# Patient Record
Sex: FEMALE | Race: OTHER | NOT HISPANIC OR LATINO
[De-identification: names, ages, dates, MRNs, and addresses within clinical notes are randomized per-mention and may not be internally consistent; named-entity substitution may affect disease eponyms.]

---

## 2017-01-05 ENCOUNTER — CHART COPY (OUTPATIENT)
Age: 37
End: 2017-01-05

## 2017-01-18 ENCOUNTER — APPOINTMENT (OUTPATIENT)
Dept: ENDOCRINOLOGY | Facility: CLINIC | Age: 37
End: 2017-01-18

## 2017-01-18 VITALS
SYSTOLIC BLOOD PRESSURE: 112 MMHG | DIASTOLIC BLOOD PRESSURE: 79 MMHG | HEART RATE: 79 BPM | HEIGHT: 68 IN | WEIGHT: 149 LBS | BODY MASS INDEX: 22.58 KG/M2

## 2017-01-19 LAB
25(OH)D3 SERPL-MCNC: 32.9 NG/ML
ALBUMIN SERPL ELPH-MCNC: 4.4 G/DL
ALP BLD-CCNC: 104 U/L
ALT SERPL-CCNC: 15 U/L
ANION GAP SERPL CALC-SCNC: 15 MMOL/L
AST SERPL-CCNC: 22 U/L
BASOPHILS # BLD AUTO: 0.01 K/UL
BASOPHILS NFR BLD AUTO: 0.2 %
BILIRUB SERPL-MCNC: 0.6 MG/DL
BUN SERPL-MCNC: 13 MG/DL
CALCIUM SERPL-MCNC: 9.8 MG/DL
CHLORIDE SERPL-SCNC: 101 MMOL/L
CHOLEST SERPL-MCNC: 160 MG/DL
CHOLEST/HDLC SERPL: 1.6 RATIO
CO2 SERPL-SCNC: 26 MMOL/L
CREAT SERPL-MCNC: 0.77 MG/DL
CREAT SPEC-SCNC: 56 MG/DL
EOSINOPHIL # BLD AUTO: 0.04 K/UL
EOSINOPHIL NFR BLD AUTO: 0.8 %
GLUCOSE SERPL-MCNC: 107 MG/DL
HBA1C MFR BLD HPLC: 5.7 %
HCT VFR BLD CALC: 41.3 %
HDLC SERPL-MCNC: 99 MG/DL
HGB BLD-MCNC: 14.3 G/DL
IMM GRANULOCYTES NFR BLD AUTO: 0.2 %
LDLC SERPL CALC-MCNC: 51 MG/DL
LYMPHOCYTES # BLD AUTO: 1.96 K/UL
LYMPHOCYTES NFR BLD AUTO: 37.4 %
MAN DIFF?: NORMAL
MCHC RBC-ENTMCNC: 31.8 PG
MCHC RBC-ENTMCNC: 34.6 GM/DL
MCV RBC AUTO: 92 FL
MICROALBUMIN 24H UR DL<=1MG/L-MCNC: 3.5 MG/DL
MICROALBUMIN/CREAT 24H UR-RTO: 63 UG/MG
MONOCYTES # BLD AUTO: 0.32 K/UL
MONOCYTES NFR BLD AUTO: 6.1 %
NEUTROPHILS # BLD AUTO: 2.9 K/UL
NEUTROPHILS NFR BLD AUTO: 55.3 %
PLATELET # BLD AUTO: 213 K/UL
POTASSIUM SERPL-SCNC: 4.9 MMOL/L
PROT SERPL-MCNC: 7.5 G/DL
RBC # BLD: 4.49 M/UL
RBC # FLD: 12.3 %
SODIUM SERPL-SCNC: 142 MMOL/L
T3 SERPL-MCNC: 74 NG/DL
T4 FREE SERPL-MCNC: 1.3 NG/DL
TRIGL SERPL-MCNC: 48 MG/DL
TSH SERPL-ACNC: 0.8 UIU/ML
WBC # FLD AUTO: 5.24 K/UL

## 2017-01-26 ENCOUNTER — APPOINTMENT (OUTPATIENT)
Dept: INTERNAL MEDICINE | Facility: CLINIC | Age: 37
End: 2017-01-26

## 2017-02-02 ENCOUNTER — RX RENEWAL (OUTPATIENT)
Age: 37
End: 2017-02-02

## 2017-02-02 RX ORDER — LANCETS 30 GAUGE
EACH MISCELLANEOUS
Qty: 1500 | Refills: 3 | Status: ACTIVE | COMMUNITY
Start: 2017-02-02 | End: 1900-01-01

## 2017-02-28 ENCOUNTER — RX RENEWAL (OUTPATIENT)
Age: 37
End: 2017-02-28

## 2017-02-28 RX ORDER — BLOOD SUGAR DIAGNOSTIC
STRIP MISCELLANEOUS
Qty: 1500 | Refills: 3 | Status: ACTIVE | COMMUNITY
Start: 2017-02-02 | End: 1900-01-01

## 2017-03-02 ENCOUNTER — RX RENEWAL (OUTPATIENT)
Age: 37
End: 2017-03-02

## 2017-05-31 ENCOUNTER — APPOINTMENT (OUTPATIENT)
Dept: ENDOCRINOLOGY | Facility: CLINIC | Age: 37
End: 2017-05-31

## 2017-05-31 VITALS
DIASTOLIC BLOOD PRESSURE: 75 MMHG | HEART RATE: 74 BPM | SYSTOLIC BLOOD PRESSURE: 109 MMHG | BODY MASS INDEX: 21.98 KG/M2 | WEIGHT: 145 LBS | HEIGHT: 68 IN

## 2017-06-06 LAB
ALBUMIN SERPL ELPH-MCNC: 4.4 G/DL
ALP BLD-CCNC: 80 U/L
ALT SERPL-CCNC: 14 U/L
ANION GAP SERPL CALC-SCNC: 14 MMOL/L
AST SERPL-CCNC: 23 U/L
BASOPHILS # BLD AUTO: 0.01 K/UL
BASOPHILS NFR BLD AUTO: 0.2 %
BILIRUB SERPL-MCNC: 0.4 MG/DL
BUN SERPL-MCNC: 12 MG/DL
C PEPTIDE SERPL-MCNC: 0.4 NG/ML
CALCIUM SERPL-MCNC: 9.2 MG/DL
CHLORIDE SERPL-SCNC: 104 MMOL/L
CHOLEST SERPL-MCNC: 155 MG/DL
CHOLEST/HDLC SERPL: 1.6 RATIO
CO2 SERPL-SCNC: 23 MMOL/L
CREAT SERPL-MCNC: 0.76 MG/DL
CREAT SPEC-SCNC: 58 MG/DL
EOSINOPHIL # BLD AUTO: 0.04 K/UL
EOSINOPHIL NFR BLD AUTO: 0.7 %
GAD65 AB SER-MCNC: 0.07 NMOL/L
GLUCOSE SERPL-MCNC: 173 MG/DL
HBA1C MFR BLD HPLC: 6 %
HCT VFR BLD CALC: 40 %
HDLC SERPL-MCNC: 98 MG/DL
HGB BLD-MCNC: 13.9 G/DL
IMM GRANULOCYTES NFR BLD AUTO: 0.2 %
LDLC SERPL CALC-MCNC: 41 MG/DL
LYMPHOCYTES # BLD AUTO: 2.28 K/UL
LYMPHOCYTES NFR BLD AUTO: 40.6 %
MAN DIFF?: NORMAL
MCHC RBC-ENTMCNC: 31.5 PG
MCHC RBC-ENTMCNC: 34.8 GM/DL
MCV RBC AUTO: 90.7 FL
MICROALBUMIN 24H UR DL<=1MG/L-MCNC: 0.5 MG/DL
MICROALBUMIN/CREAT 24H UR-RTO: 9
MONOCYTES # BLD AUTO: 0.49 K/UL
MONOCYTES NFR BLD AUTO: 8.7 %
NEUTROPHILS # BLD AUTO: 2.78 K/UL
NEUTROPHILS NFR BLD AUTO: 49.6 %
PLATELET # BLD AUTO: 236 K/UL
POTASSIUM SERPL-SCNC: 5.2 MMOL/L
PROT SERPL-MCNC: 7.3 G/DL
RBC # BLD: 4.41 M/UL
RBC # FLD: 13 %
SODIUM SERPL-SCNC: 141 MMOL/L
T3 SERPL-MCNC: 92 NG/DL
T4 FREE SERPL-MCNC: 1.3 NG/DL
TRIGL SERPL-MCNC: 81 MG/DL
TSH SERPL-ACNC: 0.61 UIU/ML
WBC # FLD AUTO: 5.61 K/UL

## 2017-06-15 LAB — PANC ISLET CELL AB SER QL: 5 JDF UNITS

## 2018-10-29 ENCOUNTER — APPOINTMENT (OUTPATIENT)
Dept: ENDOCRINOLOGY | Facility: CLINIC | Age: 38
End: 2018-10-29
Payer: MEDICAID

## 2018-10-29 ENCOUNTER — TRANSCRIPTION ENCOUNTER (OUTPATIENT)
Age: 38
End: 2018-10-29

## 2018-10-29 VITALS
SYSTOLIC BLOOD PRESSURE: 121 MMHG | WEIGHT: 140 LBS | DIASTOLIC BLOOD PRESSURE: 81 MMHG | HEART RATE: 79 BPM | HEIGHT: 68 IN | BODY MASS INDEX: 21.22 KG/M2

## 2018-10-29 LAB
GLUCOSE BLDC GLUCOMTR-MCNC: 119
HBA1C MFR BLD HPLC: 6

## 2018-10-29 PROCEDURE — 95249 CONT GLUC MNTR PT PROV EQP: CPT

## 2018-10-29 PROCEDURE — 82962 GLUCOSE BLOOD TEST: CPT

## 2018-10-29 PROCEDURE — 83036 HEMOGLOBIN GLYCOSYLATED A1C: CPT | Mod: QW

## 2018-10-29 PROCEDURE — 99215 OFFICE O/P EST HI 40 MIN: CPT | Mod: 25

## 2018-10-30 LAB
T3 SERPL-MCNC: 80 NG/DL
T4 FREE SERPL-MCNC: 1.3 NG/DL
THYROGLOB AB SERPL-ACNC: <20 IU/ML
THYROPEROXIDASE AB SERPL IA-ACNC: <10 IU/ML
TSH SERPL-ACNC: 0.89 UIU/ML
TSI ACT/NOR SER: <0.1 IU/L

## 2018-11-05 ENCOUNTER — TRANSCRIPTION ENCOUNTER (OUTPATIENT)
Age: 38
End: 2018-11-05

## 2018-11-06 ENCOUNTER — TRANSCRIPTION ENCOUNTER (OUTPATIENT)
Age: 38
End: 2018-11-06

## 2018-11-09 ENCOUNTER — NON-APPOINTMENT (OUTPATIENT)
Age: 38
End: 2018-11-09

## 2018-11-09 ENCOUNTER — APPOINTMENT (OUTPATIENT)
Dept: HEART AND VASCULAR | Facility: CLINIC | Age: 38
End: 2018-11-09
Payer: MEDICAID

## 2018-11-09 VITALS
DIASTOLIC BLOOD PRESSURE: 75 MMHG | HEIGHT: 68 IN | SYSTOLIC BLOOD PRESSURE: 119 MMHG | WEIGHT: 147 LBS | TEMPERATURE: 98.7 F | HEART RATE: 77 BPM | OXYGEN SATURATION: 99 % | BODY MASS INDEX: 22.28 KG/M2

## 2018-11-09 PROCEDURE — 93000 ELECTROCARDIOGRAM COMPLETE: CPT

## 2018-11-09 PROCEDURE — 99204 OFFICE O/P NEW MOD 45 MIN: CPT

## 2018-11-09 NOTE — REASON FOR VISIT
[Initial Evaluation] : an initial evaluation of [Palpitations] : palpitations [FreeTextEntry1] : 38 year old female presents to evaluation of palpitations. Symptoms noted several times weekly. No associated chest pain. No syncope noted. She remarks on brief episodes, occasionally related to stress. This is noted with activity and at rest. Aside from EKG, no prior cardiac testing.

## 2018-11-09 NOTE — PHYSICAL EXAM
[General Appearance - Well Developed] : well developed [Normal Appearance] : normal appearance [Well Groomed] : well groomed [General Appearance - Well Nourished] : well nourished [No Deformities] : no deformities [General Appearance - In No Acute Distress] : no acute distress [Normal Conjunctiva] : the conjunctiva exhibited no abnormalities [Eyelids - No Xanthelasma] : the eyelids demonstrated no xanthelasmas [Normal Oral Mucosa] : normal oral mucosa [No Oral Pallor] : no oral pallor [No Oral Cyanosis] : no oral cyanosis [Normal Jugular Venous A Waves Present] : normal jugular venous A waves present [Normal Jugular Venous V Waves Present] : normal jugular venous V waves present [No Jugular Venous Garcia A Waves] : no jugular venous garcia A waves [Heart Rate And Rhythm] : heart rate and rhythm were normal [Heart Sounds] : normal S1 and S2 [Murmurs] : no murmurs present [Respiration, Rhythm And Depth] : normal respiratory rhythm and effort [Exaggerated Use Of Accessory Muscles For Inspiration] : no accessory muscle use [Auscultation Breath Sounds / Voice Sounds] : lungs were clear to auscultation bilaterally [Abdomen Soft] : soft [Abdomen Tenderness] : non-tender [Abdomen Mass (___ Cm)] : no abdominal mass palpated [Abnormal Walk] : normal gait [Gait - Sufficient For Exercise Testing] : the gait was sufficient for exercise testing [Nail Clubbing] : no clubbing of the fingernails [Cyanosis, Localized] : no localized cyanosis [Petechial Hemorrhages (___cm)] : no petechial hemorrhages [Skin Color & Pigmentation] : normal skin color and pigmentation [] : no rash [No Venous Stasis] : no venous stasis [Skin Lesions] : no skin lesions [No Skin Ulcers] : no skin ulcer [No Xanthoma] : no  xanthoma was observed [Oriented To Time, Place, And Person] : oriented to person, place, and time [Affect] : the affect was normal [Mood] : the mood was normal [No Anxiety] : not feeling anxious

## 2018-11-13 ENCOUNTER — APPOINTMENT (OUTPATIENT)
Dept: HEART AND VASCULAR | Facility: CLINIC | Age: 38
End: 2018-11-13
Payer: MEDICAID

## 2018-11-13 VITALS
DIASTOLIC BLOOD PRESSURE: 71 MMHG | OXYGEN SATURATION: 99 % | HEART RATE: 80 BPM | SYSTOLIC BLOOD PRESSURE: 115 MMHG | TEMPERATURE: 98.3 F

## 2018-11-13 PROCEDURE — 93306 TTE W/DOPPLER COMPLETE: CPT

## 2018-11-14 ENCOUNTER — RX RENEWAL (OUTPATIENT)
Age: 38
End: 2018-11-14

## 2018-11-14 ENCOUNTER — TRANSCRIPTION ENCOUNTER (OUTPATIENT)
Age: 38
End: 2018-11-14

## 2018-11-15 ENCOUNTER — TRANSCRIPTION ENCOUNTER (OUTPATIENT)
Age: 38
End: 2018-11-15

## 2018-11-16 ENCOUNTER — TRANSCRIPTION ENCOUNTER (OUTPATIENT)
Age: 38
End: 2018-11-16

## 2018-11-21 ENCOUNTER — APPOINTMENT (OUTPATIENT)
Dept: OPHTHALMOLOGY | Facility: CLINIC | Age: 38
End: 2018-11-21
Payer: MEDICAID

## 2018-11-21 PROCEDURE — 92004 COMPRE OPH EXAM NEW PT 1/>: CPT

## 2018-11-21 PROCEDURE — 92225: CPT | Mod: LT

## 2018-11-27 ENCOUNTER — TRANSCRIPTION ENCOUNTER (OUTPATIENT)
Age: 38
End: 2018-11-27

## 2018-12-02 ENCOUNTER — FORM ENCOUNTER (OUTPATIENT)
Age: 38
End: 2018-12-02

## 2018-12-03 ENCOUNTER — OUTPATIENT (OUTPATIENT)
Dept: OUTPATIENT SERVICES | Facility: HOSPITAL | Age: 38
LOS: 1 days | End: 2018-12-03
Payer: MEDICAID

## 2018-12-03 ENCOUNTER — APPOINTMENT (OUTPATIENT)
Dept: ULTRASOUND IMAGING | Facility: CLINIC | Age: 38
End: 2018-12-03

## 2018-12-03 DIAGNOSIS — E01.1 IODINE-DEFICIENCY RELATED MULTINODULAR (ENDEMIC) GOITER: ICD-10-CM

## 2018-12-03 DIAGNOSIS — E04.2 NONTOXIC MULTINODULAR GOITER: ICD-10-CM

## 2018-12-03 PROCEDURE — 76536 US EXAM OF HEAD AND NECK: CPT | Mod: 26

## 2018-12-05 ENCOUNTER — TRANSCRIPTION ENCOUNTER (OUTPATIENT)
Age: 38
End: 2018-12-05

## 2018-12-05 ENCOUNTER — OTHER (OUTPATIENT)
Age: 38
End: 2018-12-05

## 2018-12-11 ENCOUNTER — TRANSCRIPTION ENCOUNTER (OUTPATIENT)
Age: 38
End: 2018-12-11

## 2018-12-14 ENCOUNTER — APPOINTMENT (OUTPATIENT)
Dept: HEART AND VASCULAR | Facility: CLINIC | Age: 38
End: 2018-12-14
Payer: MEDICAID

## 2018-12-14 VITALS
DIASTOLIC BLOOD PRESSURE: 80 MMHG | SYSTOLIC BLOOD PRESSURE: 117 MMHG | TEMPERATURE: 98.3 F | BODY MASS INDEX: 22.28 KG/M2 | HEART RATE: 78 BPM | WEIGHT: 147 LBS | HEIGHT: 68 IN | OXYGEN SATURATION: 99 %

## 2018-12-14 PROCEDURE — 99214 OFFICE O/P EST MOD 30 MIN: CPT

## 2018-12-14 NOTE — PHYSICAL EXAM
[General Appearance - Well Developed] : well developed [Normal Appearance] : normal appearance [Well Groomed] : well groomed [General Appearance - Well Nourished] : well nourished [No Deformities] : no deformities [General Appearance - In No Acute Distress] : no acute distress [Normal Conjunctiva] : the conjunctiva exhibited no abnormalities [Eyelids - No Xanthelasma] : the eyelids demonstrated no xanthelasmas [Normal Oral Mucosa] : normal oral mucosa [No Oral Pallor] : no oral pallor [No Oral Cyanosis] : no oral cyanosis [Normal Jugular Venous A Waves Present] : normal jugular venous A waves present [Normal Jugular Venous V Waves Present] : normal jugular venous V waves present [No Jugular Venous Garcia A Waves] : no jugular venous garcia A waves [Respiration, Rhythm And Depth] : normal respiratory rhythm and effort [Exaggerated Use Of Accessory Muscles For Inspiration] : no accessory muscle use [Auscultation Breath Sounds / Voice Sounds] : lungs were clear to auscultation bilaterally [Heart Rate And Rhythm] : heart rate and rhythm were normal [Heart Sounds] : normal S1 and S2 [Murmurs] : no murmurs present [Abdomen Soft] : soft [Abdomen Tenderness] : non-tender [Abdomen Mass (___ Cm)] : no abdominal mass palpated [Abnormal Walk] : normal gait [Gait - Sufficient For Exercise Testing] : the gait was sufficient for exercise testing [Nail Clubbing] : no clubbing of the fingernails [Cyanosis, Localized] : no localized cyanosis [Petechial Hemorrhages (___cm)] : no petechial hemorrhages [Skin Color & Pigmentation] : normal skin color and pigmentation [] : no rash [No Venous Stasis] : no venous stasis [Skin Lesions] : no skin lesions [No Skin Ulcers] : no skin ulcer [No Xanthoma] : no  xanthoma was observed [Oriented To Time, Place, And Person] : oriented to person, place, and time [Affect] : the affect was normal [Mood] : the mood was normal [No Anxiety] : not feeling anxious

## 2018-12-14 NOTE — REASON FOR VISIT
[Follow-Up - Clinic] : a clinic follow-up of [Palpitations] : palpitations [FreeTextEntry1] : Ms. BLAS presents for a follow up today. She denies chest pain, dyspnea or palpitations. No issues reported with her medications. Otherwise, she is feeling well.\par \par She completed an echo and a holter. Occasional PVCs are noted on the holter. Otherwise, her work up has been unremarkable.

## 2019-01-03 ENCOUNTER — TRANSCRIPTION ENCOUNTER (OUTPATIENT)
Age: 39
End: 2019-01-03

## 2019-01-04 ENCOUNTER — TRANSCRIPTION ENCOUNTER (OUTPATIENT)
Age: 39
End: 2019-01-04

## 2019-01-29 ENCOUNTER — TRANSCRIPTION ENCOUNTER (OUTPATIENT)
Age: 39
End: 2019-01-29

## 2019-01-29 ENCOUNTER — APPOINTMENT (OUTPATIENT)
Dept: ENDOCRINOLOGY | Facility: CLINIC | Age: 39
End: 2019-01-29
Payer: MEDICAID

## 2019-01-29 ENCOUNTER — RESULT REVIEW (OUTPATIENT)
Age: 39
End: 2019-01-29

## 2019-01-29 PROCEDURE — 99214 OFFICE O/P EST MOD 30 MIN: CPT | Mod: 25

## 2019-01-29 PROCEDURE — 10005 FNA BX W/US GDN 1ST LES: CPT

## 2019-01-30 VITALS — HEIGHT: 68 IN | WEIGHT: 147 LBS | BODY MASS INDEX: 22.28 KG/M2

## 2019-01-30 NOTE — PROCEDURE
[Fine Needle Aspiration] : fine needle aspiration ~T ~C was performed [Risks] : risks [Benefits] : benefits [Alternatives] : alternatives [Consent Obtained] : written consent was obtained prior to the procedure and is detailed in the patient's record [Patient] : the patient [Ethyl Chloride] : ethyl chloride [Supine] : the patient was placed in the supine position with the neck extended as tolerated [Alcohol] : alcohol [25 gauge 1.5 inch] : A 25 gauge 1.5 inch needle was used [3 Passes] : 3 passes were made through the mass [Ultrasonic Guidance] : ultrasound guidance was employed [Sent to Histology] : the specimens were prepared in the usual manner and sent to cytopathologist [Tolerated Well] : the patient tolerated the procedure well [Vital Signs Stable] : the vital signs were stable [Hemostasis] : hemostasis was assured and the patient was discharged in satisfactory condition [No Complications] : there were no complications [Instructions Given] : handouts/patient instructions were given to patient [de-identified] : L lobe nodule [FreeTextEntry6] : Pending biopsy results

## 2019-01-30 NOTE — ASSESSMENT
[FreeTextEntry1] : Thyroid nodule:\par \par Left lower lobe nodule measuring 1.1 x 0.6 x 0.9 cm was successfully biopsied under sonographic guidance. An extra sample for PRN Afirma testing was obtained if indeterminate results are present\par

## 2019-02-05 ENCOUNTER — TRANSCRIPTION ENCOUNTER (OUTPATIENT)
Age: 39
End: 2019-02-05

## 2019-02-26 ENCOUNTER — TRANSCRIPTION ENCOUNTER (OUTPATIENT)
Age: 39
End: 2019-02-26

## 2019-02-26 ENCOUNTER — RX RENEWAL (OUTPATIENT)
Age: 39
End: 2019-02-26

## 2019-02-27 ENCOUNTER — TRANSCRIPTION ENCOUNTER (OUTPATIENT)
Age: 39
End: 2019-02-27

## 2019-02-28 ENCOUNTER — TRANSCRIPTION ENCOUNTER (OUTPATIENT)
Age: 39
End: 2019-02-28

## 2019-03-18 ENCOUNTER — APPOINTMENT (OUTPATIENT)
Dept: HEART AND VASCULAR | Facility: CLINIC | Age: 39
End: 2019-03-18

## 2019-03-24 ENCOUNTER — TRANSCRIPTION ENCOUNTER (OUTPATIENT)
Age: 39
End: 2019-03-24

## 2019-03-25 ENCOUNTER — TRANSCRIPTION ENCOUNTER (OUTPATIENT)
Age: 39
End: 2019-03-25

## 2019-03-26 ENCOUNTER — LABORATORY RESULT (OUTPATIENT)
Age: 39
End: 2019-03-26

## 2019-03-27 ENCOUNTER — RX CHANGE (OUTPATIENT)
Age: 39
End: 2019-03-27

## 2019-03-27 ENCOUNTER — APPOINTMENT (OUTPATIENT)
Dept: DERMATOLOGY | Facility: CLINIC | Age: 39
End: 2019-03-27
Payer: MEDICAID

## 2019-03-27 VITALS — DIASTOLIC BLOOD PRESSURE: 72 MMHG | SYSTOLIC BLOOD PRESSURE: 103 MMHG

## 2019-03-27 PROCEDURE — 99203 OFFICE O/P NEW LOW 30 MIN: CPT | Mod: 25

## 2019-03-27 PROCEDURE — 11102 TANGNTL BX SKIN SINGLE LES: CPT

## 2019-03-27 NOTE — ADDENDUM
[FreeTextEntry1] : I, Kodi Burroughs, acted solely as a scribe for Dr. Selam Torres on 03/27/2019  9:45AM.\par

## 2019-03-27 NOTE — HISTORY OF PRESENT ILLNESS
[FreeTextEntry1] : dryness and rash  [de-identified] : 38 yo F here for above. Referred by Dr. Reno. No personal or family history of skin cancer. Uses sunscreen regularly. Complains of rash on back. Bought Tinea versicolor natural soap 10 % sulfur but never used. Wart on right hand unchanged and tender for 1 year. Complains of dry skin on hands after washing dishes. Itches everywhere on body due to dry skin.

## 2019-03-27 NOTE — PHYSICAL EXAM
[Alert] : alert [Oriented x 3] : ~L oriented x 3 [Well Nourished] : well nourished [Conjunctiva Non-injected] : conjunctiva non-injected [No Visual Lymphadenopathy] : no visual  lymphadenopathy [No Clubbing] : no clubbing [No Edema] : no edema [No Bromhidrosis] : no bromhidrosis [No Chromhidrosis] : no chromhidrosis [FreeTextEntry3] : Smooth flesh colored 3mm papule on second dorsal PIP joint with colarette of scale\par white scaling on hands\par hypopigmented macules on back with fine white scale on scraping\par \par

## 2019-03-27 NOTE — CONSULT LETTER
[Dear  ___] : Dear  [unfilled], [Consult Letter:] : I had the pleasure of evaluating your patient, [unfilled]. [Please see my note below.] : Please see my note below. [Consult Closing:] : Thank you very much for allowing me to participate in the care of this patient.  If you have any questions, please do not hesitate to contact me. [Sincerely,] : Sincerely, [FreeTextEntry3] : Selam Torres MD\[facility]

## 2019-03-29 ENCOUNTER — RX RENEWAL (OUTPATIENT)
Age: 39
End: 2019-03-29

## 2019-04-01 ENCOUNTER — TRANSCRIPTION ENCOUNTER (OUTPATIENT)
Age: 39
End: 2019-04-01

## 2019-04-03 ENCOUNTER — TRANSCRIPTION ENCOUNTER (OUTPATIENT)
Age: 39
End: 2019-04-03

## 2019-04-04 ENCOUNTER — TRANSCRIPTION ENCOUNTER (OUTPATIENT)
Age: 39
End: 2019-04-04

## 2019-05-06 ENCOUNTER — APPOINTMENT (OUTPATIENT)
Dept: ENDOCRINOLOGY | Facility: CLINIC | Age: 39
End: 2019-05-06
Payer: MEDICAID

## 2019-05-06 VITALS
DIASTOLIC BLOOD PRESSURE: 82 MMHG | BODY MASS INDEX: 23.04 KG/M2 | HEIGHT: 68 IN | SYSTOLIC BLOOD PRESSURE: 121 MMHG | WEIGHT: 152 LBS | HEART RATE: 80 BPM

## 2019-05-06 LAB — GLUCOSE BLDC GLUCOMTR-MCNC: 201

## 2019-05-06 PROCEDURE — 82962 GLUCOSE BLOOD TEST: CPT

## 2019-05-06 PROCEDURE — 99214 OFFICE O/P EST MOD 30 MIN: CPT | Mod: 25

## 2019-05-06 NOTE — PHYSICAL EXAM
[Alert] : alert [No Acute Distress] : no acute distress [Well Nourished] : well nourished [Well Developed] : well developed [No Proptosis] : no proptosis [Normal Sclera/Conjunctiva] : normal sclera/conjunctiva [No Lid Lag] : no lid lag [No Respiratory Distress] : no respiratory distress [No Accessory Muscle Use] : no accessory muscle use [Normal Rate and Effort] : normal respiratory rhythm and effort [Clear to Auscultation] : lungs were clear to auscultation bilaterally [Normal Rate] : heart rate was normal  [Normal S1, S2] : normal S1 and S2 [Normal PMI] : the apical impulse was normal [Regular Rhythm] : with a regular rhythm [Carotids Normal] : carotid pulses were normal with no bruits [Pedal Pulses Normal] : the pedal pulses are present [No Edema] : there was no peripheral edema [No Stigmata of Cushings Syndrome] : no stigmata of cushings syndrome [Normal Gait] : normal gait [No Joint Swelling] : no joint swelling seen [No Clubbing, Cyanosis] : no clubbing  or cyanosis of the fingernails [No Involuntary Movements] : no involuntary movements were seen [No Rash] : no rash [Normal] : normal [Full ROM] : with full range of motion [No Tremors] : no tremors [Oriented x3] : oriented to person, place, and time [Acne] : no acne [Acanthosis Nigricans] : no acanthosis nigricans [Diminished Throughout Both Feet] : normal tactile sensation with monofilament testing throughout both feet

## 2019-05-06 NOTE — ASSESSMENT
[FreeTextEntry1] : Diabetes - well controlled.\par Minor adjustment to basal rate made - increased 6 am-9 am and 6 pm-12 pm.\par Will contact [employer] - to order a new pump.\par Referred to PCP  and OBGYN to establish care.\par f/u in 3 months.\par

## 2019-05-06 NOTE — HISTORY OF PRESENT ILLNESS
[FreeTextEntry1] : 37 yo female, with type 1 DM since 2006; is not aware of complications; \par Humalog via [place] pump; Dexcom - is on [place] now; might need to change it to get coverage.\par SMBG - 17 times a day.\par BG is well controlled - \par POC BG - 151 mg/dl (immediately after she had a "green shake")\par POC HbA1C - 5.3%\par patient currently breastfeeding a qo mo old daughter.\par \par Basal rates:\par 12 am - 0.225\par 2:30 am - 0.425\par 5 am - 0.65\par 7 am - 0.4\par 11 am - 0.35\par 1 pm - 0.375\par 10 pm - 0.3\par i/c - 12 am - 1/12 ; 6 am - 1/9;  6:30 pm - 1/8\par CF -  12 am - 1:60\par          6 am - 1:50\par          3 pm - 1:60\par IOB - 3 h\par has various time-related targets\par \par \par 5/31/17 MK\par Pt came for f/u\par Feels well; still breastfeeding.\par uses Dexcom - downloaded.\par \par Ophthalmology - due\par \par \par POC BG - 153 mg/dl\par \par 10/29/18 MK\par Came after a long absence.\par CC - needs new pump and CGM.\par States glycemic control id good.\par Goes through Juicing 5 days a month - hypoglycemia is usually during that time. Pt. sees Dr. Kevin, who monitors her as well. Pt has to meal- bolus herself ahead of time - is on Admelog; hypoglycemia might be related to that as well.\par Interested in learning more and getting Dexcom G-6 and Tandem pump.\par \par POC bg - 119 mg/dl\par POC A1C - 6.0%\par recent labs at PCP - will send them by fax to us.\par \par 5/6/19 MK\par Came for f/u.\par Feels that later her BG is higher than before.\par Uses Dexcom - report analyzed in detail.\par Requires adjustment of pump rates. Ready to switch for tandem - interested in Basal IQ technology.\par No other complains.

## 2019-05-13 ENCOUNTER — APPOINTMENT (OUTPATIENT)
Dept: DERMATOLOGY | Facility: CLINIC | Age: 39
End: 2019-05-13
Payer: MEDICAID

## 2019-05-13 PROCEDURE — 17110 DESTRUCTION B9 LES UP TO 14: CPT

## 2019-05-13 PROCEDURE — D0051: CPT

## 2019-05-13 PROCEDURE — 99214 OFFICE O/P EST MOD 30 MIN: CPT | Mod: 25

## 2019-05-15 ENCOUNTER — TRANSCRIPTION ENCOUNTER (OUTPATIENT)
Age: 39
End: 2019-05-15

## 2019-06-14 ENCOUNTER — NON-APPOINTMENT (OUTPATIENT)
Age: 39
End: 2019-06-14

## 2019-06-14 ENCOUNTER — APPOINTMENT (OUTPATIENT)
Dept: INTERNAL MEDICINE | Facility: CLINIC | Age: 39
End: 2019-06-14
Payer: MEDICAID

## 2019-06-14 ENCOUNTER — LABORATORY RESULT (OUTPATIENT)
Age: 39
End: 2019-06-14

## 2019-06-14 VITALS
HEART RATE: 82 BPM | SYSTOLIC BLOOD PRESSURE: 100 MMHG | HEIGHT: 68 IN | TEMPERATURE: 97.9 F | WEIGHT: 142 LBS | BODY MASS INDEX: 21.52 KG/M2 | OXYGEN SATURATION: 98 % | DIASTOLIC BLOOD PRESSURE: 80 MMHG

## 2019-06-14 DIAGNOSIS — Z83.438 FAMILY HISTORY OF OTHER DISORDER OF LIPOPROTEIN METABOLISM AND OTHER LIPIDEMIA: ICD-10-CM

## 2019-06-14 LAB — CYTOLOGY CVX/VAG DOC THIN PREP: NORMAL

## 2019-06-14 PROCEDURE — 99386 PREV VISIT NEW AGE 40-64: CPT | Mod: 25

## 2019-06-14 PROCEDURE — 36415 COLL VENOUS BLD VENIPUNCTURE: CPT

## 2019-06-14 PROCEDURE — 93000 ELECTROCARDIOGRAM COMPLETE: CPT

## 2019-06-14 RX ORDER — KETOCONAZOLE 20 MG/G
2 CREAM TOPICAL
Qty: 1 | Refills: 1 | Status: COMPLETED | COMMUNITY
Start: 2019-03-27 | End: 2019-06-14

## 2019-06-14 NOTE — HEALTH RISK ASSESSMENT
[Excellent] : ~his/her~  mood as  excellent [No falls in past year] : Patient reported no falls in the past year [0] : 2) Feeling down, depressed, or hopeless: Not at all (0) [None] : None [Employed] : employed [Alone] : lives alone [Sexually Active] : sexually active [Fully functional (bathing, dressing, toileting, transferring, walking, feeding)] : Fully functional (bathing, dressing, toileting, transferring, walking, feeding) [Fully functional (using the telephone, shopping, preparing meals, housekeeping, doing laundry, using] : Fully functional and needs no help or supervision to perform IADLs (using the telephone, shopping, preparing meals, housekeeping, doing laundry, using transportation, managing medications and managing finances) [With Patient/Caregiver] : With Patient/Caregiver [] : No [[id number]] : 0 [Change in mental status noted] : No change in mental status noted [Behavior] : denies difficulty with behavior [Language] : denies difficulty with language [Handling Complex Tasks] : denies difficulty handling complex tasks [Learning/Retaining New Information] : denies difficulty learning/retaining new information [Reasoning] : denies difficulty with reasoning [Spatial Ability and Orientation] : denies difficulty with spatial ability and orientation [Reports changes in hearing] : Reports no changes in hearing [Reports changes in vision] : Reports no changes in vision [AdvancecareDate] : 06/14/2019

## 2019-06-14 NOTE — HISTORY OF PRESENT ILLNESS
[de-identified] : DM type 1\par - follows w/ endo\par - well controlled\par - complaint w/ pump and diet\par \par Concerned about leaky gut\par - follows vegan/paleo diet for anti inflammation\par \par Milia\par - tx by derm\par - lesion on left upper eye lid needs ophtho/plastic eval\par \par Vit d def\par - takes vit K2 to keep calcium lvls normal\par \par h/o lead exposure\par - during renovation of home\par \par Tinea Versicolor\par - over upper back\par - improved w/ topical ketoconazole cream and shampoo\par \par HCM\par - up to date w/ vaccines\par - will need mammo [FreeTextEntry1] : annual exam

## 2019-06-14 NOTE — PHYSICAL EXAM
[No Acute Distress] : no acute distress [Well Nourished] : well nourished [Well Developed] : well developed [Normal Sclera/Conjunctiva] : normal sclera/conjunctiva [Well-Appearing] : well-appearing [EOMI] : extraocular movements intact [Normal Outer Ear/Nose] : the outer ears and nose were normal in appearance [Supple] : supple [No Lymphadenopathy] : no lymphadenopathy [Thyroid Normal, No Nodules] : the thyroid was normal and there were no nodules present [No Respiratory Distress] : no respiratory distress  [Clear to Auscultation] : lungs were clear to auscultation bilaterally [No Accessory Muscle Use] : no accessory muscle use [Normal Rate] : normal rate  [Normal S1, S2] : normal S1 and S2 [Regular Rhythm] : with a regular rhythm [No Murmur] : no murmur heard [No Varicosities] : no varicosities [No Edema] : there was no peripheral edema [No Extremity Clubbing/Cyanosis] : no extremity clubbing/cyanosis [Soft] : abdomen soft [Non Tender] : non-tender [Non-distended] : non-distended [No Masses] : no abdominal mass palpated [No HSM] : no HSM [Normal Supraclavicular Nodes] : no supraclavicular lymphadenopathy [Normal Anterior Cervical Nodes] : no anterior cervical lymphadenopathy [No Joint Swelling] : no joint swelling [No Rash] : no rash [Grossly Normal Strength/Tone] : grossly normal strength/tone [No Focal Deficits] : no focal deficits [Normal Gait] : normal gait [Coordination Grossly Intact] : coordination grossly intact [Normal Affect] : the affect was normal [Alert and Oriented x3] : oriented to person, place, and time [Normal Mood] : the mood was normal [Normal Insight/Judgement] : insight and judgment were intact

## 2019-06-19 LAB
25(OH)D3 SERPL-MCNC: 42.4 NG/ML
ALBUMIN SERPL ELPH-MCNC: 4.4 G/DL
ALP BLD-CCNC: 61 U/L
ALT SERPL-CCNC: 21 U/L
ANION GAP SERPL CALC-SCNC: 9 MMOL/L
APPEARANCE: CLEAR
AST SERPL-CCNC: 25 U/L
BASOPHILS # BLD AUTO: 0.03 K/UL
BASOPHILS NFR BLD AUTO: 0.5 %
BILIRUB SERPL-MCNC: 0.6 MG/DL
BILIRUBIN URINE: NEGATIVE
BLOOD URINE: NEGATIVE
BUN SERPL-MCNC: 9 MG/DL
CALCIUM SERPL-MCNC: 9.4 MG/DL
CHLORIDE SERPL-SCNC: 106 MMOL/L
CHOLEST SERPL-MCNC: 136 MG/DL
CHOLEST/HDLC SERPL: 2.1 RATIO
CO2 SERPL-SCNC: 25 MMOL/L
COLOR: YELLOW
CREAT SERPL-MCNC: 0.93 MG/DL
CREAT SPEC-SCNC: 368 MG/DL
EOSINOPHIL # BLD AUTO: 0.04 K/UL
EOSINOPHIL NFR BLD AUTO: 0.6 %
ESTIMATED AVERAGE GLUCOSE: 131 MG/DL
FERRITIN SERPL-MCNC: 45 NG/ML
FOLATE SERPL-MCNC: >20 NG/ML
GLUCOSE QUALITATIVE U: NEGATIVE
GLUCOSE SERPL-MCNC: 106 MG/DL
HBA1C MFR BLD HPLC: 6.2 %
HCT VFR BLD CALC: 41 %
HCV AB SER QL: NONREACTIVE
HCV S/CO RATIO: 0.14 S/CO
HDLC SERPL-MCNC: 66 MG/DL
HGB BLD-MCNC: 13.8 G/DL
IMM GRANULOCYTES NFR BLD AUTO: 0.2 %
IRON SATN MFR SERPL: 40 %
IRON SERPL-MCNC: 99 UG/DL
KETONES URINE: ABNORMAL
LDLC SERPL CALC-MCNC: 51 MG/DL
LEAD BLD-MCNC: <1 UG/DL
LEUKOCYTE ESTERASE URINE: NEGATIVE
LYMPHOCYTES # BLD AUTO: 2.07 K/UL
LYMPHOCYTES NFR BLD AUTO: 31.5 %
MAN DIFF?: NORMAL
MCHC RBC-ENTMCNC: 32.2 PG
MCHC RBC-ENTMCNC: 33.7 GM/DL
MCV RBC AUTO: 95.8 FL
MICROALBUMIN 24H UR DL<=1MG/L-MCNC: 4.5 MG/DL
MICROALBUMIN/CREAT 24H UR-RTO: 12 MG/G
MONOCYTES # BLD AUTO: 0.43 K/UL
MONOCYTES NFR BLD AUTO: 6.5 %
NEUTROPHILS # BLD AUTO: 3.99 K/UL
NEUTROPHILS NFR BLD AUTO: 60.7 %
NITRITE URINE: NEGATIVE
PH URINE: 6.5
PLATELET # BLD AUTO: 212 K/UL
POTASSIUM SERPL-SCNC: 4.7 MMOL/L
PROT SERPL-MCNC: 6.8 G/DL
PROTEIN URINE: ABNORMAL
RBC # BLD: 4.28 M/UL
RBC # FLD: 12.3 %
SODIUM SERPL-SCNC: 140 MMOL/L
SPECIFIC GRAVITY URINE: 1.03
T3 SERPL-MCNC: 88 NG/DL
T3FREE SERPL-MCNC: 2.6 PG/ML
T4 FREE SERPL-MCNC: 1.3 NG/DL
T4 SERPL-MCNC: 5.6 UG/DL
TIBC SERPL-MCNC: 246 UG/DL
TRIGL SERPL-MCNC: 93 MG/DL
TSH SERPL-ACNC: 0.76 UIU/ML
UIBC SERPL-MCNC: 147 UG/DL
UROBILINOGEN URINE: NORMAL
VIT B12 SERPL-MCNC: 696 PG/ML
WBC # FLD AUTO: 6.57 K/UL

## 2019-06-20 ENCOUNTER — APPOINTMENT (OUTPATIENT)
Dept: DERMATOLOGY | Facility: CLINIC | Age: 39
End: 2019-06-20
Payer: MEDICAID

## 2019-06-20 PROCEDURE — 99214 OFFICE O/P EST MOD 30 MIN: CPT | Mod: 25

## 2019-06-20 PROCEDURE — D0051: CPT

## 2019-06-20 PROCEDURE — 17110 DESTRUCTION B9 LES UP TO 14: CPT

## 2019-06-20 RX ORDER — FLUOROURACIL 50 MG/G
5 CREAM TOPICAL
Qty: 1 | Refills: 0 | Status: ACTIVE | COMMUNITY
Start: 2019-06-20 | End: 1900-01-01

## 2019-06-24 ENCOUNTER — TRANSCRIPTION ENCOUNTER (OUTPATIENT)
Age: 39
End: 2019-06-24

## 2019-06-27 ENCOUNTER — TRANSCRIPTION ENCOUNTER (OUTPATIENT)
Age: 39
End: 2019-06-27

## 2019-06-28 ENCOUNTER — TRANSCRIPTION ENCOUNTER (OUTPATIENT)
Age: 39
End: 2019-06-28

## 2019-07-01 ENCOUNTER — TRANSCRIPTION ENCOUNTER (OUTPATIENT)
Age: 39
End: 2019-07-01

## 2019-07-03 ENCOUNTER — TRANSCRIPTION ENCOUNTER (OUTPATIENT)
Age: 39
End: 2019-07-03

## 2019-08-01 ENCOUNTER — TRANSCRIPTION ENCOUNTER (OUTPATIENT)
Age: 39
End: 2019-08-01

## 2019-08-05 ENCOUNTER — TRANSCRIPTION ENCOUNTER (OUTPATIENT)
Age: 39
End: 2019-08-05

## 2019-08-12 ENCOUNTER — APPOINTMENT (OUTPATIENT)
Dept: DERMATOLOGY | Facility: CLINIC | Age: 39
End: 2019-08-12
Payer: MEDICAID

## 2019-08-12 PROCEDURE — 99214 OFFICE O/P EST MOD 30 MIN: CPT

## 2019-08-21 ENCOUNTER — APPOINTMENT (OUTPATIENT)
Dept: ENDOCRINOLOGY | Facility: CLINIC | Age: 39
End: 2019-08-21
Payer: MEDICAID

## 2019-08-21 VITALS
BODY MASS INDEX: 21.13 KG/M2 | SYSTOLIC BLOOD PRESSURE: 108 MMHG | WEIGHT: 139 LBS | HEART RATE: 64 BPM | DIASTOLIC BLOOD PRESSURE: 72 MMHG

## 2019-08-21 LAB — GLUCOSE BLDC GLUCOMTR-MCNC: 88

## 2019-08-21 PROCEDURE — 82962 GLUCOSE BLOOD TEST: CPT

## 2019-08-21 PROCEDURE — 99214 OFFICE O/P EST MOD 30 MIN: CPT | Mod: 25

## 2019-08-21 NOTE — ASSESSMENT
[FreeTextEntry1] : Diabetes -  well controlled, recent A1C - 6.2% at Dr. Ge's visit.\par Continue the same tx, and \par f/u with Dr. Witt.

## 2019-08-21 NOTE — PHYSICAL EXAM
[Alert] : alert [No Acute Distress] : no acute distress [Well Nourished] : well nourished [Well Developed] : well developed [Healthy Appearance] : healthy appearance [Normal Voice/Communication] : normal voice communication [Normal Sclera/Conjunctiva] : normal sclera/conjunctiva [No Proptosis] : no proptosis [No Lid Lag] : no lid lag [No Respiratory Distress] : no respiratory distress [Normal Rate and Effort] : normal respiratory rhythm and effort [No Accessory Muscle Use] : no accessory muscle use [Normal Rate] : heart rate was normal  [No Edema] : there was no peripheral edema [No Stigmata of Cushings Syndrome] : no stigmata of cushings syndrome [Normal Gait] : normal gait [No Joint Swelling] : no joint swelling seen [No Clubbing, Cyanosis] : no clubbing  or cyanosis of the fingernails [No Involuntary Movements] : no involuntary movements were seen [No Rash] : no rash [Acne] : no acne [Hirsutism] : no hirsutism [Acanthosis Nigricans] : no acanthosis nigricans [No Tremors] : no tremors [Oriented x3] : oriented to person, place, and time

## 2019-08-21 NOTE — HISTORY OF PRESENT ILLNESS
[FreeTextEntry1] : 35 yo female, with type 1 DM since 2006; is not aware of complications; \par Humalog via [place] pump; Dexcom - is on [place] now; might need to change it to get coverage.\par SMBG - 17 times a day.\par BG is well controlled - \par POC BG - 151 mg/dl (immediately after she had a "green shake")\par POC HbA1C - 5.3%\par patient currently breastfeeding a qo mo old daughter.\par \par Basal rates:\par 12 am - 0.225\par 2:30 am - 0.425\par 5 am - 0.65\par 7 am - 0.4\par 11 am - 0.35\par 1 pm - 0.375\par 10 pm - 0.3\par i/c - 12 am - 1/12 ; 6 am - 1/9;  6:30 pm - 1/8\par CF -  12 am - 1:60\par          6 am - 1:50\par          3 pm - 1:60\par IOB - 3 h\par has various time-related targets\par \par \par 5/31/17 MK\par Pt came for f/u\par Feels well; still breastfeeding.\par uses Dexcom - downloaded.\par \par Ophthalmology - due\par \par \par POC BG - 153 mg/dl\par \par 10/29/18 MK\par Came after a long absence.\par CC - needs new pump and CGM.\par States glycemic control id good.\par Goes through Juicing 5 days a month - hypoglycemia is usually during that time. Pt. sees Dr. Kevin, who monitors her as well. Pt has to meal- bolus herself ahead of time - is on Admelog; hypoglycemia might be related to that as well.\par Interested in learning more and getting Dexcom G-6 and Tandem pump.\par \par POC bg - 119 mg/dl\par POC A1C - 6.0%\par recent labs at PCP - will send them by fax to us.\par \par 5/6/19 MK\par Came for f/u.\par Feels that later her BG is higher than before.\par Uses Dexcom - report analyzed in detail.\par Requires adjustment of pump rates. Ready to switch for tandem - interested in Basal IQ technology.\par No other complains.\par \par 8/21/19 MK\par Came for f/u on type 1 DM.\par Feels well. Was transitioned to T-slim; waiting for Dexcom G-6 to start using Basal IQ.\par CGM report uploaded, patterns analyzed and discussed with pt.\par No new complains.\par Of note - considers getting a second child, maybe.

## 2019-09-03 ENCOUNTER — APPOINTMENT (OUTPATIENT)
Dept: ENDOCRINOLOGY | Facility: CLINIC | Age: 39
End: 2019-09-03
Payer: MEDICAID

## 2019-09-03 ENCOUNTER — TRANSCRIPTION ENCOUNTER (OUTPATIENT)
Age: 39
End: 2019-09-03

## 2019-09-03 VITALS
BODY MASS INDEX: 21.67 KG/M2 | DIASTOLIC BLOOD PRESSURE: 76 MMHG | SYSTOLIC BLOOD PRESSURE: 111 MMHG | HEIGHT: 68 IN | HEART RATE: 67 BPM | WEIGHT: 143 LBS

## 2019-09-03 LAB
GLUCOSE BLDC GLUCOMTR-MCNC: 115
HBA1C MFR BLD HPLC: 6.2

## 2019-09-03 PROCEDURE — 82962 GLUCOSE BLOOD TEST: CPT

## 2019-09-03 PROCEDURE — 83036 HEMOGLOBIN GLYCOSYLATED A1C: CPT | Mod: QW

## 2019-09-03 PROCEDURE — 99215 OFFICE O/P EST HI 40 MIN: CPT | Mod: 25

## 2019-09-03 NOTE — ASSESSMENT
[FreeTextEntry1] : DM, probably JOCELYNE, adequate glycemic control.\par Glycemic goal is HbA1C of 7%, so there is room for reducing insulin dose and reducing frequency of hypoglycemic episodes.\par MNG, probably benign 1 cm thyroid nodule - will repeat thyroid u/sound.\par Prescriptions refilled.\par F/u - 3 months or as needed.

## 2019-09-03 NOTE — REVIEW OF SYSTEMS
[As Noted in HPI] : as noted in HPI [Negative] : Heme/Lymph [FreeTextEntry2] : occasional hypoglycemic reactions without LOC

## 2019-09-03 NOTE — PHYSICAL EXAM
[No Acute Distress] : no acute distress [Alert] : alert [Well Nourished] : well nourished [Well Developed] : well developed [Normal Sclera/Conjunctiva] : normal sclera/conjunctiva [EOMI] : extra ocular movement intact [Normal Oropharynx] : the oropharynx was normal [Thyroid Not Enlarged] : the thyroid was not enlarged [No Proptosis] : no proptosis [No Respiratory Distress] : no respiratory distress [No Thyroid Nodules] : there were no palpable thyroid nodules [No Accessory Muscle Use] : no accessory muscle use [Clear to Auscultation] : lungs were clear to auscultation bilaterally [Normal Rate] : heart rate was normal  [Normal S1, S2] : normal S1 and S2 [Regular Rhythm] : with a regular rhythm [Pedal Pulses Normal] : the pedal pulses are present [Normal Bowel Sounds] : normal bowel sounds [No Edema] : there was no peripheral edema [Not Tender] : non-tender [Soft] : abdomen soft [Not Distended] : not distended [Post Cervical Nodes] : posterior cervical nodes [Anterior Cervical Nodes] : anterior cervical nodes [Axillary Nodes] : axillary nodes [Normal] : normal and non tender [No Spinal Tenderness] : no spinal tenderness [Spine Straight] : spine straight [No Stigmata of Cushings Syndrome] : no stigmata of cushings syndrome [Normal Gait] : normal gait [Normal Strength/Tone] : muscle strength and tone were normal [No Rash] : no rash [Normal Reflexes] : deep tendon reflexes were 2+ and symmetric [No Tremors] : no tremors [Oriented x3] : oriented to person, place, and time [Acanthosis Nigricans] : no acanthosis nigricans

## 2019-09-03 NOTE — HISTORY OF PRESENT ILLNESS
[FreeTextEntry1] : 39 y. o. female, not previously seen by me,  with a h/o DM since 2006.\par Is thought to have type 1 Dm, but C-peptide was still detectable in 5/2017 (0.4).\par Anti-islet cell and anti-TIM AB positive.\par She is using [employer] insulin pump and CGM.\par BSs are overall in good control but she does experience mild hypoglycemic reactions occasionally, without LOC.\par She is not known to have retinopathy, neuropathy or nephropathy.\par She has a 3.5 y.o. child and is not planning any pregnancies in the immediate future.\par HbA1C today is 6.2%, glucose - 115 mg/dl.\par Her weight has been stable.\par About 1 yr ago she was found to have a thyroid nodule. FNAB was c/w [place] 4, but molecular studies were c/w low risk of cancer (4%). TFTs have been normal and anti-thyroid AB negative.

## 2019-09-09 ENCOUNTER — TRANSCRIPTION ENCOUNTER (OUTPATIENT)
Age: 39
End: 2019-09-09

## 2019-09-09 LAB
25(OH)D3 SERPL-MCNC: 40.6 NG/ML
ALBUMIN SERPL ELPH-MCNC: 4.4 G/DL
ALP BLD-CCNC: 67 U/L
ALT SERPL-CCNC: 14 U/L
ANION GAP SERPL CALC-SCNC: 12 MMOL/L
AST SERPL-CCNC: 23 U/L
BASOPHILS # BLD AUTO: 0.03 K/UL
BASOPHILS NFR BLD AUTO: 0.4 %
BILIRUB SERPL-MCNC: 0.4 MG/DL
BUN SERPL-MCNC: 11 MG/DL
C PEPTIDE SERPL-MCNC: 0.3 NG/ML
CALCIUM SERPL-MCNC: 9.3 MG/DL
CHLORIDE SERPL-SCNC: 101 MMOL/L
CHOLEST SERPL-MCNC: 137 MG/DL
CHOLEST/HDLC SERPL: 1.6 RATIO
CO2 SERPL-SCNC: 27 MMOL/L
CREAT SERPL-MCNC: 0.73 MG/DL
CREAT SPEC-SCNC: 22 MG/DL
EOSINOPHIL # BLD AUTO: 0.04 K/UL
EOSINOPHIL NFR BLD AUTO: 0.5 %
ESTIMATED AVERAGE GLUCOSE: 126 MG/DL
GLUCOSE SERPL-MCNC: 123 MG/DL
HBA1C MFR BLD HPLC: 6 %
HCT VFR BLD CALC: 37.9 %
HDLC SERPL-MCNC: 87 MG/DL
HGB BLD-MCNC: 12.5 G/DL
IMM GRANULOCYTES NFR BLD AUTO: 0.2 %
LDLC SERPL CALC-MCNC: 39 MG/DL
LYMPHOCYTES # BLD AUTO: 2.4 K/UL
LYMPHOCYTES NFR BLD AUTO: 29.1 %
MAN DIFF?: NORMAL
MCHC RBC-ENTMCNC: 31.4 PG
MCHC RBC-ENTMCNC: 33 GM/DL
MCV RBC AUTO: 95.2 FL
MICROALBUMIN 24H UR DL<=1MG/L-MCNC: <1.2 MG/DL
MICROALBUMIN/CREAT 24H UR-RTO: NORMAL MG/G
MONOCYTES # BLD AUTO: 0.63 K/UL
MONOCYTES NFR BLD AUTO: 7.6 %
NEUTROPHILS # BLD AUTO: 5.12 K/UL
NEUTROPHILS NFR BLD AUTO: 62.2 %
PLATELET # BLD AUTO: 206 K/UL
POTASSIUM SERPL-SCNC: 3.9 MMOL/L
PROT SERPL-MCNC: 6.9 G/DL
RBC # BLD: 3.98 M/UL
RBC # FLD: 12.5 %
SODIUM SERPL-SCNC: 140 MMOL/L
T3 SERPL-MCNC: 104 NG/DL
T4 FREE SERPL-MCNC: 1.3 NG/DL
TRIGL SERPL-MCNC: 54 MG/DL
TSH SERPL-ACNC: 1.26 UIU/ML
WBC # FLD AUTO: 8.24 K/UL

## 2019-09-18 ENCOUNTER — APPOINTMENT (OUTPATIENT)
Dept: INTERNAL MEDICINE | Facility: CLINIC | Age: 39
End: 2019-09-18
Payer: MEDICAID

## 2019-09-20 ENCOUNTER — APPOINTMENT (OUTPATIENT)
Dept: INTERNAL MEDICINE | Facility: CLINIC | Age: 39
End: 2019-09-20
Payer: MEDICAID

## 2019-09-20 VITALS
HEART RATE: 67 BPM | SYSTOLIC BLOOD PRESSURE: 114 MMHG | HEIGHT: 68 IN | BODY MASS INDEX: 21.67 KG/M2 | TEMPERATURE: 97.7 F | DIASTOLIC BLOOD PRESSURE: 80 MMHG | OXYGEN SATURATION: 97 % | WEIGHT: 143 LBS

## 2019-09-20 PROCEDURE — 99213 OFFICE O/P EST LOW 20 MIN: CPT

## 2019-09-20 NOTE — HISTORY OF PRESENT ILLNESS
[de-identified] : DM1\par - compliant w/ insulin\par - has been having episodes of hypoglycemia\par - would like to f/u w/ endo about lowering insulin, is apprehensive. \par \par Milia\par - removed by derm. [FreeTextEntry1] : follow up

## 2019-09-20 NOTE — PHYSICAL EXAM
[No Acute Distress] : no acute distress [Well Nourished] : well nourished [Normal Sclera/Conjunctiva] : normal sclera/conjunctiva [Well Developed] : well developed [EOMI] : extraocular movements intact [No Respiratory Distress] : no respiratory distress  [Normal Affect] : the affect was normal [No Rash] : no rash [Normal Mood] : the mood was normal [Normal Insight/Judgement] : insight and judgment were intact

## 2019-09-27 ENCOUNTER — RX RENEWAL (OUTPATIENT)
Age: 39
End: 2019-09-27

## 2019-09-27 ENCOUNTER — TRANSCRIPTION ENCOUNTER (OUTPATIENT)
Age: 39
End: 2019-09-27

## 2019-10-04 ENCOUNTER — TRANSCRIPTION ENCOUNTER (OUTPATIENT)
Age: 39
End: 2019-10-04

## 2019-11-05 ENCOUNTER — TRANSCRIPTION ENCOUNTER (OUTPATIENT)
Age: 39
End: 2019-11-05

## 2019-11-27 ENCOUNTER — APPOINTMENT (OUTPATIENT)
Dept: OPHTHALMOLOGY | Facility: CLINIC | Age: 39
End: 2019-11-27
Payer: MEDICAID

## 2019-11-27 ENCOUNTER — NON-APPOINTMENT (OUTPATIENT)
Age: 39
End: 2019-11-27

## 2019-11-27 PROCEDURE — 92014 COMPRE OPH EXAM EST PT 1/>: CPT

## 2019-11-27 PROCEDURE — 92250 FUNDUS PHOTOGRAPHY W/I&R: CPT

## 2020-01-03 ENCOUNTER — TRANSCRIPTION ENCOUNTER (OUTPATIENT)
Age: 40
End: 2020-01-03

## 2020-01-28 ENCOUNTER — TRANSCRIPTION ENCOUNTER (OUTPATIENT)
Age: 40
End: 2020-01-28

## 2020-02-03 ENCOUNTER — APPOINTMENT (OUTPATIENT)
Dept: INTERNAL MEDICINE | Facility: CLINIC | Age: 40
End: 2020-02-03

## 2020-02-07 ENCOUNTER — TRANSCRIPTION ENCOUNTER (OUTPATIENT)
Age: 40
End: 2020-02-07

## 2020-02-10 ENCOUNTER — TRANSCRIPTION ENCOUNTER (OUTPATIENT)
Age: 40
End: 2020-02-10

## 2020-02-19 ENCOUNTER — APPOINTMENT (OUTPATIENT)
Dept: ENDOCRINOLOGY | Facility: CLINIC | Age: 40
End: 2020-02-19

## 2020-02-28 ENCOUNTER — APPOINTMENT (OUTPATIENT)
Dept: INTERNAL MEDICINE | Facility: CLINIC | Age: 40
End: 2020-02-28
Payer: MEDICAID

## 2020-02-28 VITALS
HEART RATE: 78 BPM | TEMPERATURE: 98.6 F | SYSTOLIC BLOOD PRESSURE: 120 MMHG | WEIGHT: 146 LBS | OXYGEN SATURATION: 98 % | HEIGHT: 68 IN | DIASTOLIC BLOOD PRESSURE: 70 MMHG | BODY MASS INDEX: 22.13 KG/M2

## 2020-02-28 PROCEDURE — 36415 COLL VENOUS BLD VENIPUNCTURE: CPT

## 2020-02-28 PROCEDURE — 99213 OFFICE O/P EST LOW 20 MIN: CPT | Mod: 25

## 2020-02-28 NOTE — HISTORY OF PRESENT ILLNESS
[FreeTextEntry1] : follow up [de-identified] : DM1\par - compliant w/ insulin pump and CGM\par - FS have improved w/ new pump\par - no further episodes of hypoglycemia\par - will need labs checked before endo visit next week

## 2020-03-04 ENCOUNTER — TRANSCRIPTION ENCOUNTER (OUTPATIENT)
Age: 40
End: 2020-03-04

## 2020-03-04 LAB
ALBUMIN SERPL ELPH-MCNC: 4.4 G/DL
ALP BLD-CCNC: 56 U/L
ALT SERPL-CCNC: 9 U/L
ANION GAP SERPL CALC-SCNC: 11 MMOL/L
APPEARANCE: CLEAR
AST SERPL-CCNC: 23 U/L
BASOPHILS # BLD AUTO: 0.02 K/UL
BASOPHILS NFR BLD AUTO: 0.4 %
BILIRUB SERPL-MCNC: 0.6 MG/DL
BILIRUBIN URINE: NEGATIVE
BLOOD URINE: NEGATIVE
BUN SERPL-MCNC: 11 MG/DL
CALCIUM SERPL-MCNC: 9.4 MG/DL
CHLORIDE SERPL-SCNC: 103 MMOL/L
CHOLEST SERPL-MCNC: 162 MG/DL
CHOLEST/HDLC SERPL: 1.6 RATIO
CO2 SERPL-SCNC: 26 MMOL/L
COLOR: YELLOW
CREAT SERPL-MCNC: 0.68 MG/DL
CREAT SPEC-SCNC: 132 MG/DL
EOSINOPHIL # BLD AUTO: 0.04 K/UL
EOSINOPHIL NFR BLD AUTO: 0.7 %
ESTIMATED AVERAGE GLUCOSE: 137 MG/DL
GLUCOSE QUALITATIVE U: NEGATIVE
GLUCOSE SERPL-MCNC: 181 MG/DL
HBA1C MFR BLD HPLC: 6.4 %
HCT VFR BLD CALC: 40.5 %
HDLC SERPL-MCNC: 100 MG/DL
HGB BLD-MCNC: 13.4 G/DL
IMM GRANULOCYTES NFR BLD AUTO: 0.2 %
KETONES URINE: NEGATIVE
LDLC SERPL CALC-MCNC: 53 MG/DL
LEUKOCYTE ESTERASE URINE: NEGATIVE
LYMPHOCYTES # BLD AUTO: 1.94 K/UL
LYMPHOCYTES NFR BLD AUTO: 35.5 %
MAN DIFF?: NORMAL
MCHC RBC-ENTMCNC: 32.5 PG
MCHC RBC-ENTMCNC: 33.1 GM/DL
MCV RBC AUTO: 98.3 FL
MICROALBUMIN 24H UR DL<=1MG/L-MCNC: 1.6 MG/DL
MICROALBUMIN/CREAT 24H UR-RTO: 12 MG/G
MONOCYTES # BLD AUTO: 0.39 K/UL
MONOCYTES NFR BLD AUTO: 7.1 %
NEUTROPHILS # BLD AUTO: 3.07 K/UL
NEUTROPHILS NFR BLD AUTO: 56.1 %
NITRITE URINE: NEGATIVE
PH URINE: 8
PLATELET # BLD AUTO: 215 K/UL
POTASSIUM SERPL-SCNC: 4.5 MMOL/L
PROT SERPL-MCNC: 6.6 G/DL
PROTEIN URINE: NORMAL
RBC # BLD: 4.12 M/UL
RBC # FLD: 12.6 %
SODIUM SERPL-SCNC: 139 MMOL/L
SPECIFIC GRAVITY URINE: 1.02
T4 FREE SERPL-MCNC: 1.4 NG/DL
TRIGL SERPL-MCNC: 46 MG/DL
TSH SERPL-ACNC: 0.96 UIU/ML
UROBILINOGEN URINE: NORMAL
WBC # FLD AUTO: 5.47 K/UL

## 2020-03-05 ENCOUNTER — APPOINTMENT (OUTPATIENT)
Dept: ENDOCRINOLOGY | Facility: CLINIC | Age: 40
End: 2020-03-05

## 2020-03-10 ENCOUNTER — APPOINTMENT (OUTPATIENT)
Dept: ENDOCRINOLOGY | Facility: CLINIC | Age: 40
End: 2020-03-10
Payer: MEDICAID

## 2020-03-10 VITALS
HEART RATE: 71 BPM | SYSTOLIC BLOOD PRESSURE: 119 MMHG | BODY MASS INDEX: 21.82 KG/M2 | WEIGHT: 144 LBS | DIASTOLIC BLOOD PRESSURE: 82 MMHG | HEIGHT: 68 IN

## 2020-03-10 LAB — GLUCOSE BLDC GLUCOMTR-MCNC: 73

## 2020-03-10 PROCEDURE — 82962 GLUCOSE BLOOD TEST: CPT

## 2020-03-10 PROCEDURE — 99214 OFFICE O/P EST MOD 30 MIN: CPT | Mod: 25

## 2020-03-10 NOTE — HISTORY OF PRESENT ILLNESS
[FreeTextEntry1] : 37 yo female, with type 1 DM since 2006; is not aware of complications; \par Humalog via [place] pump; Dexcom - is on Saji now; might need to change it to get coverage.\par SMBG - 17 times a day.\par BG is well controlled - \par POC BG - 151 mg/dl (immediately after she had a "green shake")\par POC HbA1C - 5.3%\par patient currently breastfeeding a qo mo old daughter.\par \par Basal rates:\par 12 am - 0.225\par 2:30 am - 0.425\par 5 am - 0.65\par 7 am - 0.4\par 11 am - 0.35\par 1 pm - 0.375\par 10 pm - 0.3\par i/c - 12 am - 1/12 ; 6 am - 1/9;  6:30 pm - 1/8\par CF -  12 am - 1:60\par          6 am - 1:50\par          3 pm - 1:60\par IOB - 3 h\par has various time-related targets\par \par \par 5/31/17 MK\par Pt came for f/u\par Feels well; still breastfeeding.\par uses Dexcom - downloaded.\par \par Ophthalmology - due\par \par \par POC BG - 153 mg/dl\par \par 10/29/18 MK\par Came after a long absence.\par CC - needs new pump and CGM.\par States glycemic control id good.\par Goes through Juicing 5 days a month - hypoglycemia is usually during that time. Pt. sees Dr. Kevin, who monitors her as well. Pt has to meal- bolus herself ahead of time - is on Admelog; hypoglycemia might be related to that as well.\par Interested in learning more and getting Dexcom G-6 and Tandem pump.\par \par POC bg - 119 mg/dl\par POC A1C - 6.0%\par recent labs at PCP - will send them by fax to us.\par \par 5/6/19 MK\par Came for f/u.\par Feels that later her BG is higher than before.\par Uses Dexcom - report analyzed in detail.\par Requires adjustment of pump rates. Ready to switch for tandem - interested in Basal IQ technology.\par No other complains.\par \par 8/21/19 MK\par Came for f/u on type 1 DM.\par Feels well. Was transitioned to T-slim; waiting for Dexcom G-6 to start using Basal IQ.\par CGM report uploaded, patterns analyzed and discussed with pt.\par No new complains.\par Of note - considers getting a second child, maybe. \par \par 3/10/20 MK\par Started control IQ about a month ago; rates were adjusted by Dr. kevin - pt is very happy with control.\par CGM report uploaded, analyzed and discussed with pt.\par Recent labs from Dr. Ge - A1C - 6.4%; no hypoglycemia.\par Pt is concerned about her thyroid nodule, and f/u with US.\par No other changes or complains.

## 2020-03-10 NOTE — PHYSICAL EXAM
[Alert] : alert [No Acute Distress] : no acute distress [Well Nourished] : well nourished [Well Developed] : well developed [Healthy Appearance] : healthy appearance [Normal Sclera/Conjunctiva] : normal sclera/conjunctiva [No Proptosis] : no proptosis [No Lid Lag] : no lid lag [No Respiratory Distress] : no respiratory distress [Normal Rate and Effort] : normal respiratory rhythm and effort [No Accessory Muscle Use] : no accessory muscle use [Normal Rate] : heart rate was normal  [No Edema] : there was no peripheral edema [Normal] : normal and non tender [No Stigmata of Cushings Syndrome] : no stigmata of cushings syndrome [Normal Gait] : normal gait [No Joint Swelling] : no joint swelling seen [No Clubbing, Cyanosis] : no clubbing  or cyanosis of the fingernails [No Involuntary Movements] : no involuntary movements were seen [No Rash] : no rash [No Tremors] : no tremors [Oriented x3] : oriented to person, place, and time [Acne] : no acne [Hirsutism] : no hirsutism [Acanthosis Nigricans] : no acanthosis nigricans

## 2020-03-27 ENCOUNTER — TRANSCRIPTION ENCOUNTER (OUTPATIENT)
Age: 40
End: 2020-03-27

## 2020-04-02 ENCOUNTER — TRANSCRIPTION ENCOUNTER (OUTPATIENT)
Age: 40
End: 2020-04-02

## 2020-07-09 ENCOUNTER — APPOINTMENT (OUTPATIENT)
Dept: DERMATOLOGY | Facility: CLINIC | Age: 40
End: 2020-07-09

## 2020-07-15 ENCOUNTER — APPOINTMENT (OUTPATIENT)
Dept: DERMATOLOGY | Facility: CLINIC | Age: 40
End: 2020-07-15
Payer: MEDICAID

## 2020-07-15 PROCEDURE — 99214 OFFICE O/P EST MOD 30 MIN: CPT | Mod: 95

## 2020-07-15 NOTE — PHYSICAL EXAM
[Oriented x 3] : ~L oriented x 3 [Alert] : alert [Conjunctiva Non-injected] : conjunctiva non-injected [Well Nourished] : well nourished [No Clubbing] : no clubbing [No Edema] : no edema [No Visual Lymphadenopathy] : no visual  lymphadenopathy [No Chromhidrosis] : no chromhidrosis [No Bromhidrosis] : no bromhidrosis [FreeTextEntry3] : glucose monitor on abdomen\par brown macules on cheeks

## 2020-07-15 NOTE — ASSESSMENT
[FreeTextEntry1] : also discussed concerns regarding traumatic laceration on father's face - can apply mupirocin bid but if spreading redness, edema, tenderness, drainage, or fever needs evaluation for cellulitis

## 2020-07-15 NOTE — HISTORY OF PRESENT ILLNESS
[Home] : at home, [unfilled] , at the time of the visit. [Other Location: e.g. 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

## 2020-08-24 ENCOUNTER — APPOINTMENT (OUTPATIENT)
Dept: INTERNAL MEDICINE | Facility: CLINIC | Age: 40
End: 2020-08-24

## 2020-09-01 ENCOUNTER — TRANSCRIPTION ENCOUNTER (OUTPATIENT)
Age: 40
End: 2020-09-01

## 2020-09-03 ENCOUNTER — TRANSCRIPTION ENCOUNTER (OUTPATIENT)
Age: 40
End: 2020-09-03

## 2020-09-08 ENCOUNTER — TRANSCRIPTION ENCOUNTER (OUTPATIENT)
Age: 40
End: 2020-09-08

## 2020-09-09 ENCOUNTER — TRANSCRIPTION ENCOUNTER (OUTPATIENT)
Age: 40
End: 2020-09-09

## 2020-09-10 ENCOUNTER — TRANSCRIPTION ENCOUNTER (OUTPATIENT)
Age: 40
End: 2020-09-10

## 2020-09-11 ENCOUNTER — RESULT REVIEW (OUTPATIENT)
Age: 40
End: 2020-09-11

## 2020-09-11 ENCOUNTER — OUTPATIENT (OUTPATIENT)
Dept: OUTPATIENT SERVICES | Facility: HOSPITAL | Age: 40
LOS: 1 days | End: 2020-09-11

## 2020-09-11 ENCOUNTER — APPOINTMENT (OUTPATIENT)
Dept: ULTRASOUND IMAGING | Facility: CLINIC | Age: 40
End: 2020-09-11
Payer: MEDICAID

## 2020-09-11 ENCOUNTER — APPOINTMENT (OUTPATIENT)
Dept: MAMMOGRAPHY | Facility: CLINIC | Age: 40
End: 2020-09-11
Payer: MEDICAID

## 2020-09-11 PROCEDURE — 76536 US EXAM OF HEAD AND NECK: CPT | Mod: 26

## 2020-09-11 PROCEDURE — 77067 SCR MAMMO BI INCL CAD: CPT | Mod: 26

## 2020-09-11 PROCEDURE — 77063 BREAST TOMOSYNTHESIS BI: CPT | Mod: 26

## 2020-09-14 ENCOUNTER — TRANSCRIPTION ENCOUNTER (OUTPATIENT)
Age: 40
End: 2020-09-14

## 2020-09-15 ENCOUNTER — TRANSCRIPTION ENCOUNTER (OUTPATIENT)
Age: 40
End: 2020-09-15

## 2020-09-17 ENCOUNTER — APPOINTMENT (OUTPATIENT)
Dept: ENDOCRINOLOGY | Facility: CLINIC | Age: 40
End: 2020-09-17
Payer: MEDICAID

## 2020-09-17 VITALS
BODY MASS INDEX: 22.88 KG/M2 | HEART RATE: 70 BPM | HEIGHT: 68 IN | WEIGHT: 151 LBS | DIASTOLIC BLOOD PRESSURE: 84 MMHG | SYSTOLIC BLOOD PRESSURE: 126 MMHG

## 2020-09-17 LAB
GLUCOSE BLDC GLUCOMTR-MCNC: 172
HBA1C MFR BLD HPLC: 6.1

## 2020-09-17 PROCEDURE — 83036 HEMOGLOBIN GLYCOSYLATED A1C: CPT | Mod: QW

## 2020-09-17 PROCEDURE — 82962 GLUCOSE BLOOD TEST: CPT

## 2020-09-17 PROCEDURE — 99214 OFFICE O/P EST MOD 30 MIN: CPT | Mod: 25

## 2020-09-17 NOTE — HISTORY OF PRESENT ILLNESS
[FreeTextEntry1] : 39 y. o. female, not previously seen by me,  with a h/o DM since 2006.\par Is thought to have type 1 Dm, but C-peptide was still detectable in 5/2017 (0.4).\par Anti-islet cell and anti-TIM AB positive.\par She is using T-Slim insulin pump and CGM.\par BSs are overall in good control but she does experience mild hypoglycemic reactions occasionally, without LOC.\par She is not known to have retinopathy, neuropathy or nephropathy.\par She has a 3.5 y.o. child and is not planning any pregnancies in the immediate future.\par HbA1C today is 6.2%, glucose - 115 mg/dl.\par Her weight has been stable.\par About 1 yr ago she was found to have a thyroid nodule. FNAB was c/w [place] 4, but molecular studies were c/w low risk of cancer (4%). TFTs have been normal and anti-thyroid AB negative.\par 9/17/2020. The patient is doing well.\par She has gained 8 lb.\par Is now on tandem insulin pump.\par Has occasional mild hypoglycemia.\par HbA1C today is 6.1%, glucose - 172 mg/dl.\par Thyroid u/sound 9/11/2020 unchanged when compared to 12/3/2018.

## 2020-09-17 NOTE — ASSESSMENT
[FreeTextEntry1] : The patient's condition is stable - would continue current management.\par Lab. and radiological data reviewed.\par Surgical option for the thyroid nodule discussed - declined.\par Medications refilled.\par F/u - 3 months.

## 2020-09-17 NOTE — REASON FOR VISIT
[Follow - Up] : a follow-up visit [DM Type 1] : DM Type 1 [Thyroid nodule/ MNG] : thyroid nodule/ MNG

## 2020-09-29 ENCOUNTER — RX RENEWAL (OUTPATIENT)
Age: 40
End: 2020-09-29

## 2020-10-03 ENCOUNTER — TRANSCRIPTION ENCOUNTER (OUTPATIENT)
Age: 40
End: 2020-10-03

## 2020-10-04 ENCOUNTER — EMERGENCY (EMERGENCY)
Facility: HOSPITAL | Age: 40
LOS: 1 days | Discharge: HOME | End: 2020-10-04
Attending: EMERGENCY MEDICINE | Admitting: EMERGENCY MEDICINE
Payer: COMMERCIAL

## 2020-10-04 VITALS
OXYGEN SATURATION: 98 % | TEMPERATURE: 98 F | HEART RATE: 80 BPM | SYSTOLIC BLOOD PRESSURE: 127 MMHG | DIASTOLIC BLOOD PRESSURE: 84 MMHG | RESPIRATION RATE: 18 BRPM

## 2020-10-04 DIAGNOSIS — Z23 ENCOUNTER FOR IMMUNIZATION: ICD-10-CM
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
## 2020-10-04 DIAGNOSIS — S68.121A PARTIAL TRAUMATIC METACARPOPHALANGEAL AMPUTATION OF LEFT INDEX FINGER, INITIAL ENCOUNTER: ICD-10-CM
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
## 2020-10-04 PROCEDURE — 99284 EMERGENCY DEPT VISIT MOD MDM: CPT

## 2020-10-04 RX ORDER — CEPHALEXIN 500 MG
500 CAPSULE ORAL ONCE
Refills: 0 | Status: COMPLETED | OUTPATIENT
Start: 2020-10-04 | End: 2020-10-04

## 2020-10-04 RX ORDER — TETANUS TOXOID, REDUCED DIPHTHERIA TOXOID AND ACELLULAR PERTUSSIS VACCINE, ADSORBED 5; 2.5; 8; 8; 2.5 [IU]/.5ML; [IU]/.5ML; UG/.5ML; UG/.5ML; UG/.5ML
0.5 SUSPENSION INTRAMUSCULAR ONCE
Refills: 0 | Status: COMPLETED | OUTPATIENT
Start: 2020-10-04 | End: 2020-10-04

## 2020-10-04 RX ORDER — MUPIROCIN 20 MG/G
1 OINTMENT TOPICAL
Qty: 1 | Refills: 0
Start: 2020-10-04 | End: 2020-10-10

## 2020-10-04 RX ORDER — BACITRACIN ZINC 500 UNIT/G
1 OINTMENT IN PACKET (EA) TOPICAL ONCE
Refills: 0 | Status: COMPLETED | OUTPATIENT
Start: 2020-10-04 | End: 2020-10-04

## 2020-10-04 RX ORDER — CEFUROXIME AXETIL 250 MG
1 TABLET ORAL
Qty: 14 | Refills: 0
Start: 2020-10-04 | End: 2020-10-10

## 2020-10-04 RX ORDER — ACETAMINOPHEN 500 MG
650 TABLET ORAL ONCE
Refills: 0 | Status: COMPLETED | OUTPATIENT
Start: 2020-10-04 | End: 2020-10-04

## 2020-10-04 RX ADMIN — Medication 1 APPLICATION(S): at 12:32

## 2020-10-04 RX ADMIN — Medication 500 MILLIGRAM(S): at 12:34

## 2020-10-04 RX ADMIN — TETANUS TOXOID, REDUCED DIPHTHERIA TOXOID AND ACELLULAR PERTUSSIS VACCINE, ADSORBED 0.5 MILLILITER(S): 5; 2.5; 8; 8; 2.5 SUSPENSION INTRAMUSCULAR at 12:33

## 2020-10-04 RX ADMIN — Medication 650 MILLIGRAM(S): at 14:20

## 2020-10-04 NOTE — ED PROVIDER NOTE - CLINICAL SUMMARY MEDICAL DECISION MAKING FREE TEXT BOX
39 y/o female presents with left pointer fingertip laceration which she reimplanted herself. Will order antimicrobials and refer to Dr. Mcmanus.

## 2020-10-04 NOTE — ED ADULT NURSE NOTE - NSIMPLEMENTINTERV_GEN_ALL_ED
Implemented All Universal Safety Interventions:  [place] to call system. Call bell, personal items and telephone within reach. Instruct patient to call for assistance. Room bathroom lighting operational. Non-slip footwear when patient is off stretcher. Physically safe environment: no spills, clutter or unnecessary equipment. Stretcher in lowest position, wheels locked, appropriate side rails in place.

## 2020-10-04 NOTE — ED PROVIDER NOTE - PATIENT PORTAL LINK FT
You can access the FollowMyHealth Patient Portal offered by [facility] by registering at the following website: [email or web address]. By joining [facility]’s FollowMyHealth portal, you will also be able to view your health information using other applications (apps) compatible with our system.

## 2020-10-04 NOTE — ED PROVIDER NOTE - OBJECTIVE STATEMENT
39 y/o female with no PMHx presents c/o fingertip amputation 3 days ago. States she reimplanted the fingertip herself. Denies tingling, numbness, nausea, and vomiting.

## 2020-10-04 NOTE — ED PROVIDER NOTE - NSCAREINITIATED _GEN_ER
Swing pt wants to know if her prolia inj should be sent to her or to us through her pharm    Omar, Stella EGAN (Self) [phone] (M)        Kodi Stafford(Attending)

## 2020-10-09 ENCOUNTER — TRANSCRIPTION ENCOUNTER (OUTPATIENT)
Age: 40
End: 2020-10-09

## 2020-10-13 PROBLEM — Z78.9 OTHER SPECIFIED HEALTH STATUS: Chronic | Status: ACTIVE | Noted: 2020-10-04

## 2020-10-16 ENCOUNTER — APPOINTMENT (OUTPATIENT)
Dept: MAMMOGRAPHY | Facility: CLINIC | Age: 40
End: 2020-10-16
Payer: MEDICAID

## 2020-10-16 ENCOUNTER — RESULT REVIEW (OUTPATIENT)
Age: 40
End: 2020-10-16

## 2020-10-16 ENCOUNTER — OUTPATIENT (OUTPATIENT)
Dept: OUTPATIENT SERVICES | Facility: HOSPITAL | Age: 40
LOS: 1 days | End: 2020-10-16

## 2020-10-16 PROCEDURE — G0279: CPT | Mod: 26

## 2020-10-16 PROCEDURE — 77066 DX MAMMO INCL CAD BI: CPT | Mod: 26

## 2020-10-22 ENCOUNTER — TRANSCRIPTION ENCOUNTER (OUTPATIENT)
Age: 40
End: 2020-10-22

## 2020-10-23 ENCOUNTER — RESULT REVIEW (OUTPATIENT)
Age: 40
End: 2020-10-23

## 2020-10-23 ENCOUNTER — APPOINTMENT (OUTPATIENT)
Dept: MAMMOGRAPHY | Facility: CLINIC | Age: 40
End: 2020-10-23
Payer: MEDICAID

## 2020-10-23 ENCOUNTER — OUTPATIENT (OUTPATIENT)
Dept: OUTPATIENT SERVICES | Facility: HOSPITAL | Age: 40
LOS: 1 days | End: 2020-10-23

## 2020-10-23 PROCEDURE — 77065 DX MAMMO INCL CAD UNI: CPT | Mod: 26,LT

## 2020-10-23 PROCEDURE — 88305 TISSUE EXAM BY PATHOLOGIST: CPT | Mod: 26

## 2020-10-23 PROCEDURE — 19081 BX BREAST 1ST LESION STRTCTC: CPT | Mod: LT

## 2020-10-28 LAB — SURGICAL PATHOLOGY STUDY: SIGNIFICANT CHANGE UP

## 2020-10-30 ENCOUNTER — TRANSCRIPTION ENCOUNTER (OUTPATIENT)
Age: 40
End: 2020-10-30

## 2020-11-10 ENCOUNTER — APPOINTMENT (OUTPATIENT)
Dept: INTERNAL MEDICINE | Facility: CLINIC | Age: 40
End: 2020-11-10
Payer: MEDICAID

## 2020-11-10 VITALS
HEIGHT: 68 IN | HEART RATE: 75 BPM | WEIGHT: 152 LBS | DIASTOLIC BLOOD PRESSURE: 81 MMHG | SYSTOLIC BLOOD PRESSURE: 119 MMHG | BODY MASS INDEX: 23.04 KG/M2 | TEMPERATURE: 98 F | OXYGEN SATURATION: 98 %

## 2020-11-10 DIAGNOSIS — Z11.59 ENCOUNTER FOR SCREENING FOR OTHER VIRAL DISEASES: ICD-10-CM

## 2020-11-10 DIAGNOSIS — Z23 ENCOUNTER FOR IMMUNIZATION: ICD-10-CM

## 2020-11-10 DIAGNOSIS — Z87.09 PERSONAL HISTORY OF OTHER DISEASES OF THE RESPIRATORY SYSTEM: ICD-10-CM

## 2020-11-10 PROCEDURE — G0008: CPT

## 2020-11-10 PROCEDURE — 36415 COLL VENOUS BLD VENIPUNCTURE: CPT

## 2020-11-10 PROCEDURE — 99072 ADDL SUPL MATRL&STAF TM PHE: CPT

## 2020-11-10 PROCEDURE — 99396 PREV VISIT EST AGE 40-64: CPT | Mod: 25

## 2020-11-10 PROCEDURE — 90686 IIV4 VACC NO PRSV 0.5 ML IM: CPT

## 2020-11-10 RX ORDER — INSULIN ASPART INJECTION 100 [IU]/ML
100 INJECTION, SOLUTION SUBCUTANEOUS
Qty: 3 | Refills: 0 | Status: COMPLETED | COMMUNITY
Start: 2018-11-14 | End: 2020-11-10

## 2020-11-10 NOTE — HISTORY OF PRESENT ILLNESS
[FreeTextEntry1] : annual [de-identified] : right pointer finger injury\par - accidentally cut tip of finger while cooking\par - was evaluated in ED\par - 2 sutures applied\par \par DM1\par - has appt w/ endo\par - needs labs checked\par - compliant w/ pump\par \par sore throat\par - worse w/ swallowing\par \par HCM\par - needs gyn eval\par - mammo/ s/p left breast bx, path normal. has mild bruising and discomfort\par - needs flu vax.

## 2020-11-10 NOTE — PHYSICAL EXAM
[No Acute Distress] : no acute distress [Well Nourished] : well nourished [Well Developed] : well developed [Well-Appearing] : well-appearing [Normal Sclera/Conjunctiva] : normal sclera/conjunctiva [EOMI] : extraocular movements intact [No Respiratory Distress] : no respiratory distress  [No Accessory Muscle Use] : no accessory muscle use [Clear to Auscultation] : lungs were clear to auscultation bilaterally [Normal Rate] : normal rate  [Regular Rhythm] : with a regular rhythm [Normal S1, S2] : normal S1 and S2 [No Murmur] : no murmur heard [No Carotid Bruits] : no carotid bruits [No Abdominal Bruit] : a ~M bruit was not heard ~T in the abdomen [No Varicosities] : no varicosities [Pedal Pulses Present] : the pedal pulses are present [No Edema] : there was no peripheral edema [No Extremity Clubbing/Cyanosis] : no extremity clubbing/cyanosis [Soft] : abdomen soft [Non Tender] : non-tender [Non-distended] : non-distended [No Masses] : no abdominal mass palpated [No HSM] : no HSM [No Joint Swelling] : no joint swelling [Grossly Normal Strength/Tone] : grossly normal strength/tone [No Rash] : no rash [Coordination Grossly Intact] : coordination grossly intact [No Focal Deficits] : no focal deficits [Normal Gait] : normal gait [Normal Affect] : the affect was normal [Alert and Oriented x3] : oriented to person, place, and time [Normal Mood] : the mood was normal [Normal Insight/Judgement] : insight and judgment were intact [de-identified] : RIGHT sided tonsils edema w/ mild cobblestone appearance.

## 2020-11-25 ENCOUNTER — NON-APPOINTMENT (OUTPATIENT)
Age: 40
End: 2020-11-25

## 2020-11-25 ENCOUNTER — APPOINTMENT (OUTPATIENT)
Dept: OPHTHALMOLOGY | Facility: CLINIC | Age: 40
End: 2020-11-25
Payer: MEDICAID

## 2020-11-25 PROCEDURE — 92014 COMPRE OPH EXAM EST PT 1/>: CPT

## 2020-11-25 PROCEDURE — 99072 ADDL SUPL MATRL&STAF TM PHE: CPT

## 2020-11-25 PROCEDURE — 92134 CPTRZ OPH DX IMG PST SGM RTA: CPT

## 2020-11-30 ENCOUNTER — TRANSCRIPTION ENCOUNTER (OUTPATIENT)
Age: 40
End: 2020-11-30

## 2020-11-30 LAB
25(OH)D3 SERPL-MCNC: 36.2 NG/ML
ALBUMIN SERPL ELPH-MCNC: 4.5 G/DL
ALP BLD-CCNC: 59 U/L
ALT SERPL-CCNC: 14 U/L
ANION GAP SERPL CALC-SCNC: 12 MMOL/L
APPEARANCE: CLEAR
AST SERPL-CCNC: 22 U/L
BACTERIA THROAT CULT: NORMAL
BASOPHILS # BLD AUTO: 0.02 K/UL
BASOPHILS NFR BLD AUTO: 0.3 %
BILIRUB SERPL-MCNC: 0.6 MG/DL
BILIRUBIN URINE: NEGATIVE
BLOOD URINE: NEGATIVE
BUN SERPL-MCNC: 10 MG/DL
CALCIUM SERPL-MCNC: 9.3 MG/DL
CHLORIDE SERPL-SCNC: 101 MMOL/L
CHOLEST SERPL-MCNC: 174 MG/DL
CO2 SERPL-SCNC: 24 MMOL/L
COLOR: NORMAL
CREAT SERPL-MCNC: 0.73 MG/DL
EOSINOPHIL # BLD AUTO: 0.02 K/UL
EOSINOPHIL NFR BLD AUTO: 0.3 %
ESTIMATED AVERAGE GLUCOSE: 134 MG/DL
GLUCOSE QUALITATIVE U: NEGATIVE
GLUCOSE SERPL-MCNC: 146 MG/DL
HBA1C MFR BLD HPLC: 6.3 %
HCT VFR BLD CALC: 41 %
HCV AB SER QL: NONREACTIVE
HCV S/CO RATIO: 0.09 S/CO
HDLC SERPL-MCNC: 107 MG/DL
HGB BLD-MCNC: 13.9 G/DL
IMM GRANULOCYTES NFR BLD AUTO: 0.3 %
KETONES URINE: ABNORMAL
LDLC SERPL CALC-MCNC: 56 MG/DL
LEUKOCYTE ESTERASE URINE: NEGATIVE
LYMPHOCYTES # BLD AUTO: 1.84 K/UL
LYMPHOCYTES NFR BLD AUTO: 23.8 %
MAN DIFF?: NORMAL
MCHC RBC-ENTMCNC: 33.1 PG
MCHC RBC-ENTMCNC: 33.9 GM/DL
MCV RBC AUTO: 97.6 FL
MONOCYTES # BLD AUTO: 0.53 K/UL
MONOCYTES NFR BLD AUTO: 6.9 %
NEUTROPHILS # BLD AUTO: 5.3 K/UL
NEUTROPHILS NFR BLD AUTO: 68.4 %
NITRITE URINE: NEGATIVE
NONHDLC SERPL-MCNC: 67 MG/DL
PH URINE: 6.5
PLATELET # BLD AUTO: 212 K/UL
POTASSIUM SERPL-SCNC: 4.6 MMOL/L
PROT SERPL-MCNC: 6.9 G/DL
PROTEIN URINE: NEGATIVE
RBC # BLD: 4.2 M/UL
RBC # FLD: 12.1 %
SARS-COV-2 IGG SERPL IA-ACNC: 0.1 INDEX
SARS-COV-2 IGG SERPL QL IA: NEGATIVE
SODIUM SERPL-SCNC: 137 MMOL/L
SPECIFIC GRAVITY URINE: 1.01
T4 FREE SERPL-MCNC: 1.2 NG/DL
TRIGL SERPL-MCNC: 51 MG/DL
TSH SERPL-ACNC: 0.69 UIU/ML
UROBILINOGEN URINE: NORMAL
WBC # FLD AUTO: 7.73 K/UL

## 2020-12-23 PROBLEM — Z87.09 HISTORY OF SORE THROAT: Status: RESOLVED | Noted: 2020-11-10 | Resolved: 2020-12-23

## 2021-02-23 ENCOUNTER — NON-APPOINTMENT (OUTPATIENT)
Age: 41
End: 2021-02-23

## 2021-02-23 ENCOUNTER — TRANSCRIPTION ENCOUNTER (OUTPATIENT)
Age: 41
End: 2021-02-23

## 2021-04-01 ENCOUNTER — TRANSCRIPTION ENCOUNTER (OUTPATIENT)
Age: 41
End: 2021-04-01

## 2021-04-02 ENCOUNTER — APPOINTMENT (OUTPATIENT)
Dept: ENDOCRINOLOGY | Facility: CLINIC | Age: 41
End: 2021-04-02

## 2021-05-04 ENCOUNTER — APPOINTMENT (OUTPATIENT)
Dept: OBGYN | Facility: CLINIC | Age: 41
End: 2021-05-04
Payer: MEDICAID

## 2021-05-04 VITALS
SYSTOLIC BLOOD PRESSURE: 110 MMHG | WEIGHT: 155 LBS | BODY MASS INDEX: 23.49 KG/M2 | HEIGHT: 68 IN | DIASTOLIC BLOOD PRESSURE: 80 MMHG

## 2021-05-04 PROCEDURE — 99072 ADDL SUPL MATRL&STAF TM PHE: CPT

## 2021-05-04 PROCEDURE — 99386 PREV VISIT NEW AGE 40-64: CPT

## 2021-05-04 NOTE — HISTORY OF PRESENT ILLNESS
[Patient reported mammogram was normal] : Patient reported mammogram was normal [Patient reported PAP Smear was normal] : Patient reported PAP Smear was normal [Y] : Patient is sexually active [Normal Amount/Duration] :  normal amount and duration [Regular Cycle Intervals] : periods have been regular [Frequency: Q ___ days] : menstrual periods occur approximately every [unfilled] days [Menarche Age: ____] : age at menarche was [unfilled] [Currently Active] : currently active [No] : Patient does not have concerns regarding sex [Men] : men [Mammogramdate] : 2021 [PapSmeardate] : 2019 [LMPDate] : 04/19/2021 [MensesFreq] : 28 [MensesLength] : 5-8 [PGHxTotal] : 2 [Val[facility] Hospitaliving] : 1 [PGHxABSpont] : 1 [FreeTextEntry1] : 04/19/2021

## 2021-05-04 NOTE — PHYSICAL EXAM
[Appropriately responsive] : appropriately responsive [Alert] : alert [No Acute Distress] : no acute distress [No Lymphadenopathy] : no lymphadenopathy [Soft] : soft [Non-tender] : non-tender [Non-distended] : non-distended [Oriented x3] : oriented x3 [Examination Of The Breasts] : a normal appearance [No Masses] : no breast masses were palpable [Labia Majora] : normal [Labia Minora] : normal [IUD String] : an IUD string was noted [Normal] : normal [Uterine Adnexae] : normal

## 2021-05-04 NOTE — DISCUSSION/SUMMARY
[FreeTextEntry1] : 42 yo presents for initial gynecological visit, has no complaints\par \par HCM: pap and HPV done, STI done, breast imaging up to date\par \par contraceptive: Reviewed ParaGard can work up to 12 years, does cause heavier cycles can change to LNG IUD which cause light/no cycles\par \par HPV vaccine: literature given, reviewed vaccine indication, Rx sent to pharmacy. She will make an appointment to get vaccine if she decides to proceed. \par \par -f/u 1 year/PRN

## 2021-05-06 NOTE — ED PROVIDER NOTE - CHIEF COMPLAINT
normal gait and station , no tenderness or deformities present The patient is a 40y Female complaining of lacerations.

## 2021-05-17 ENCOUNTER — TRANSCRIPTION ENCOUNTER (OUTPATIENT)
Age: 41
End: 2021-05-17

## 2021-05-17 LAB
C TRACH RRNA SPEC QL NAA+PROBE: NOT DETECTED
CYTOLOGY CVX/VAG DOC THIN PREP: NORMAL
HBV SURFACE AG SER QL: NONREACTIVE
HCV AB SER QL: NONREACTIVE
HCV S/CO RATIO: 0.12 S/CO
HIV1+2 AB SPEC QL IA.RAPID: NONREACTIVE
HPV HIGH+LOW RISK DNA PNL CVX: NOT DETECTED
N GONORRHOEA RRNA SPEC QL NAA+PROBE: NOT DETECTED
SOURCE AMPLIFICATION: NORMAL
T PALLIDUM AB SER QL IA: NEGATIVE

## 2021-06-15 ENCOUNTER — APPOINTMENT (OUTPATIENT)
Dept: ENDOCRINOLOGY | Facility: CLINIC | Age: 41
End: 2021-06-15
Payer: MEDICAID

## 2021-06-15 PROCEDURE — 99214 OFFICE O/P EST MOD 30 MIN: CPT | Mod: 95

## 2021-06-15 NOTE — REASON FOR VISIT
[Home] : at home, [unfilled] , at the time of the visit. [Medical Office: ([facility])___] : at the medical office located in  [Verbal consent obtained from patient] : the patient, [unfilled] [Follow - Up] : a follow-up visit [DM Type 1] : DM Type 1

## 2021-06-15 NOTE — ASSESSMENT
[FreeTextEntry1] : Diabetes - control is at goal.\par T-connect report analyzed; patterns discussed; \par Will Change I/C to1:8 from 7 am to 6 pm.\par instructed to change to 1:^ in 3 days if postprandial BG is high;\par Benefits of exercise discussed; Prevention of post-exercise lows reviewed.\par Weight change and insulin demand explained;\par Weight loss and possible adjustment of pump rates discussed;\par Information about Gvoke provided; Rx sent;\par Lab report discussed.\par F/U PRN. [Exercise/Effect on Glucose] : exercise/effect on glucose [Hypoglycemia Management] : hypoglycemia management [Insulin Self-Administration] : insulin self-administration [Glucagon Use] : glucagon use [Weight Loss] : weight loss

## 2021-06-15 NOTE — HISTORY OF PRESENT ILLNESS
[FreeTextEntry1] : 35 yo female, with type 1 DM since ; is not aware of complications; \par Humalog via [place] pump; Dexcom - is on Saji now; might need to change it to get coverage.\par SMBG - 17 times a day.\par BG is well controlled - \par POC BG - 151 mg/dl (immediately after she had a "green shake")\par POC HbA1C - 5.3%\par patient currently breastfeeding a qo mo old daughter.\par \par Basal rates:\par 12 am - 0.225\par 2:30 am - 0.425\par 5 am - 0.65\par 7 am - 0.4\par 11 am - 0.35\par 1 pm - 0.375\par 10 pm - 0.3\par i/c - 12 am -  ; 6 am - ;  6:30 pm - \par CF -  12 am - 1:60\par          6 am - 1:50\par          3 pm - 1:60\par IOB - 3 h\par has various time-related targets\par \par \par 17 MK\par Pt came for f/u\par Feels well; still breastfeeding.\par uses Dexcom - downloaded.\par \par Ophthalmology - due\par \par \par POC BG - 153 mg/dl\par \par 10/29/18 MK\par Came after a long absence.\par CC - needs new pump and CGM.\par States glycemic control id good.\par Goes through Juicing 5 days a month - hypoglycemia is usually during that time. Pt. sees Dr. Kevin, who monitors her as well. Pt has to meal- bolus herself ahead of time - is on Admelog; hypoglycemia might be related to that as well.\par Interested in learning more and getting Dexcom G-6 and Tandem pump.\par \par POC bg - 119 mg/dl\par POC A1C - 6.0%\par recent labs at PCP - will send them by fax to us.\par \par 19 MK\par Came for f/u.\par Feels that later her BG is higher than before.\par Uses Dexcom - report analyzed in detail.\par Requires adjustment of pump rates. Ready to switch for tandem - interested in Basal IQ technology.\par No other complains.\par \par 19 MK\par Came for f/u on type 1 DM.\par Feels well. Was transitioned to T-slim; waiting for Dexcom G-6 to start using Basal IQ.\par CGM report uploaded, patterns analyzed and discussed with pt.\par No new complains.\par Of note - considers getting a second child, maybe. \par \par 3/10/20 MK\par Started control IQ about a month ago; rates were adjusted by Dr. kevin - pt is very happy with control.\par CGM report uploaded, analyzed and discussed with pt.\par Recent labs from Dr. Ge - A1C - 6.4%; no hypoglycemia.\par Pt is concerned about her thyroid nodule, and f/u with US.\par No other changes or complains.\par \par 6/15/2021 Mk\par Pt needs help adjusting pump rates.\par C/O severe hypoglycemia 3 days ago - she changed the infusion site to a "virgin" one, after unsuccessfully overcorrecting for hyperglycemia;  and her BG dropped to 33 mg/dl, and then "low";\par she did not lose consciousness, and it took her "a long time' to bring it back.\par Her glucagon emergency kit ;\par recent A1C from outside lab was 6.3% (copy sent to us, and will be scanned to chart);\par CGM report downloaded and discussed with pt in detail.\par needs to change I/C.\par gained ~ 10 lb over the last year, as she stopped exercise due to covid.\par Lost her father last December.\par No other changes or complains.\par

## 2021-07-21 ENCOUNTER — TRANSCRIPTION ENCOUNTER (OUTPATIENT)
Age: 41
End: 2021-07-21

## 2021-07-21 ENCOUNTER — NON-APPOINTMENT (OUTPATIENT)
Age: 41
End: 2021-07-21

## 2021-07-22 ENCOUNTER — TRANSCRIPTION ENCOUNTER (OUTPATIENT)
Age: 41
End: 2021-07-22

## 2021-09-09 ENCOUNTER — APPOINTMENT (OUTPATIENT)
Dept: ENDOCRINOLOGY | Facility: CLINIC | Age: 41
End: 2021-09-09

## 2021-09-14 ENCOUNTER — APPOINTMENT (OUTPATIENT)
Dept: OBGYN | Facility: CLINIC | Age: 41
End: 2021-09-14

## 2021-09-27 ENCOUNTER — APPOINTMENT (OUTPATIENT)
Dept: DERMATOLOGY | Facility: CLINIC | Age: 41
End: 2021-09-27
Payer: MEDICAID

## 2021-09-27 ENCOUNTER — TRANSCRIPTION ENCOUNTER (OUTPATIENT)
Age: 41
End: 2021-09-27

## 2021-09-27 PROCEDURE — D0051: CPT

## 2021-09-27 PROCEDURE — 99214 OFFICE O/P EST MOD 30 MIN: CPT

## 2021-09-27 RX ORDER — BETAMETHASONE DIPROPIONATE 0.5 MG/G
0.05 CREAM, AUGMENTED TOPICAL
Qty: 1 | Refills: 2 | Status: ACTIVE | COMMUNITY
Start: 2021-09-27 | End: 1900-01-01

## 2021-09-27 NOTE — HISTORY OF PRESENT ILLNESS
[FreeTextEntry1] : white head, rash on hands  [de-identified] : no personal or FH skin cancer\par no new or changing spots but has a lot on back can not see\par father passed at home peacefully\par rash on hands improved with otc hydrocortisone but coming back, a little itchy\par no pets, using dr kim peppermint soap on hands

## 2021-09-27 NOTE — PHYSICAL EXAM
[Alert] : alert [Oriented x 3] : ~L oriented x 3 [Well Nourished] : well nourished [Conjunctiva Non-injected] : conjunctiva non-injected [No Visual Lymphadenopathy] : no visual  lymphadenopathy [No Clubbing] : no clubbing [No Edema] : no edema [No Bromhidrosis] : no bromhidrosis [No Chromhidrosis] : no chromhidrosis [Full Body Skin Exam Performed] : performed [FreeTextEntry3] : R dorsal hand asteatotic papule\par <1 mm firm white papules cheeks \par stuck on brown papules back \par hyperkeratosis heels

## 2021-10-13 ENCOUNTER — RX RENEWAL (OUTPATIENT)
Age: 41
End: 2021-10-13

## 2021-10-14 ENCOUNTER — TRANSCRIPTION ENCOUNTER (OUTPATIENT)
Age: 41
End: 2021-10-14

## 2021-10-18 ENCOUNTER — TRANSCRIPTION ENCOUNTER (OUTPATIENT)
Age: 41
End: 2021-10-18

## 2021-10-25 ENCOUNTER — APPOINTMENT (OUTPATIENT)
Dept: OTOLARYNGOLOGY | Facility: CLINIC | Age: 41
End: 2021-10-25
Payer: MEDICAID

## 2021-10-25 VITALS
DIASTOLIC BLOOD PRESSURE: 82 MMHG | HEIGHT: 68 IN | SYSTOLIC BLOOD PRESSURE: 121 MMHG | OXYGEN SATURATION: 98 % | TEMPERATURE: 98 F | HEART RATE: 73 BPM | WEIGHT: 150 LBS | BODY MASS INDEX: 22.73 KG/M2

## 2021-10-25 PROCEDURE — 99203 OFFICE O/P NEW LOW 30 MIN: CPT | Mod: 25

## 2021-10-25 PROCEDURE — 69210 REMOVE IMPACTED EAR WAX UNI: CPT

## 2021-10-26 NOTE — PROCEDURE
[FreeTextEntry3] : Cerumen Removal/Ear Cleaning for Otitis Externa\par Pre-operative Diagnosis: right Cerumen Impaction, right ear clogged sensation\par Post-operative Diagnosis: Right otitis externa, right cerumen impaction\par Procedure: Binocular microscopy with cerumen/debris removal- [id number]\par Procedure Details: \par The patient was placed in the supine position. The operating microscope was positioned. I then placed the ear speculum in the right EAC. Cerumen and debris were then removed using suction. The TM was noted to be intact. The patient tolerated procedure well.\par \par Findings: \par Bilateral Ear Canal - normal\par Bilateral Tympanic Membrane - normal\par \par Recommendations: Debrox\par Complications: None\par \par

## 2021-10-26 NOTE — ASSESSMENT
[FreeTextEntry1] : 41F c/o right otalgia, clogging sensation x 1 month.  On exam, she had cerumen and debris in the right EAC which was suctioned without complications.  Her exam and history are consistent with otitis externa.  I recommend Ciprodex for 10 days and she will return for follow-up in 2 weeks.\par \par - Otitis externa handout given\par - stop Q-tip use\par - Ciprodex x 10 days\par - f/u 2 weeks

## 2021-10-26 NOTE — HISTORY OF PRESENT ILLNESS
[de-identified] : 41F c/o right ear clogging with popping/cracking x 1 month whenever she takes a bath or shower.  It would last up to a day.  It is associated with occasional itching, pain and irritation.  Denies otorrhea, tinnitus, vertiginous symptoms.  She has been using qtips.\par \par Denies other ENT complaints.\par \par

## 2021-10-26 NOTE — PHYSICAL EXAM
[Midline] : trachea located in midline position [Normal] : orientation to person, place, and time: normal [de-identified] : right EAC cerumen and debris, left EAC WNL

## 2021-11-08 ENCOUNTER — APPOINTMENT (OUTPATIENT)
Dept: OTOLARYNGOLOGY | Facility: CLINIC | Age: 41
End: 2021-11-08
Payer: MEDICAID

## 2021-11-08 ENCOUNTER — TRANSCRIPTION ENCOUNTER (OUTPATIENT)
Age: 41
End: 2021-11-08

## 2021-11-08 VITALS — TEMPERATURE: 97.8 F | SYSTOLIC BLOOD PRESSURE: 126 MMHG | HEART RATE: 81 BPM | DIASTOLIC BLOOD PRESSURE: 82 MMHG

## 2021-11-08 PROCEDURE — 99212 OFFICE O/P EST SF 10 MIN: CPT

## 2021-11-08 NOTE — ASSESSMENT
[FreeTextEntry1] : 41F c/o right otalgia, clogging sensation x 1 month. She used ciprodex for otitis externa with resolution of most of her symptoms.  On exam, ear is normal.  She will occasionally have ear itch at this point.  I am recommending observation for 1mo.  I believe things will go back to baseline.  If itch persists will treat with steroid otic gtts, as long as there is no infection.\par \par - observation\par - fu 1 mo

## 2021-11-08 NOTE — HISTORY OF PRESENT ILLNESS
[de-identified] : 41F c/o right ear clogging with popping/cracking x 1 month whenever she takes a bath or shower.  It would last up to a day.  It is associated with occasional itching, pain and irritation.  Denies otorrhea, tinnitus, vertiginous symptoms.  She has been using qtips.\par \par Denies other ENT complaints.\par \par - [FreeTextEntry1] : Update 11/8/21\par overall stable and well. she used the ciprodex x 10 days.  He notes improvement in symptoms and no longer had crackling or popping.

## 2021-11-09 ENCOUNTER — TRANSCRIPTION ENCOUNTER (OUTPATIENT)
Age: 41
End: 2021-11-09

## 2021-11-11 ENCOUNTER — TRANSCRIPTION ENCOUNTER (OUTPATIENT)
Age: 41
End: 2021-11-11

## 2021-11-12 ENCOUNTER — TRANSCRIPTION ENCOUNTER (OUTPATIENT)
Age: 41
End: 2021-11-12

## 2021-11-16 ENCOUNTER — TRANSCRIPTION ENCOUNTER (OUTPATIENT)
Age: 41
End: 2021-11-16

## 2021-11-24 ENCOUNTER — NON-APPOINTMENT (OUTPATIENT)
Age: 41
End: 2021-11-24

## 2021-11-24 ENCOUNTER — APPOINTMENT (OUTPATIENT)
Dept: OPHTHALMOLOGY | Facility: CLINIC | Age: 41
End: 2021-11-24
Payer: MEDICAID

## 2021-11-24 PROCEDURE — 92250 FUNDUS PHOTOGRAPHY W/I&R: CPT

## 2021-11-24 PROCEDURE — 92014 COMPRE OPH EXAM EST PT 1/>: CPT

## 2021-12-13 ENCOUNTER — APPOINTMENT (OUTPATIENT)
Dept: ENDOCRINOLOGY | Facility: CLINIC | Age: 41
End: 2021-12-13
Payer: MEDICAID

## 2021-12-13 VITALS
SYSTOLIC BLOOD PRESSURE: 129 MMHG | WEIGHT: 151 LBS | DIASTOLIC BLOOD PRESSURE: 81 MMHG | HEART RATE: 81 BPM | BODY MASS INDEX: 22.96 KG/M2

## 2021-12-13 LAB
GLUCOSE BLDC GLUCOMTR-MCNC: 222
HBA1C MFR BLD HPLC: 6.3

## 2021-12-13 PROCEDURE — 95251 CONT GLUC MNTR ANALYSIS I&R: CPT

## 2021-12-13 PROCEDURE — 83036 HEMOGLOBIN GLYCOSYLATED A1C: CPT | Mod: QW

## 2021-12-13 PROCEDURE — 82962 GLUCOSE BLOOD TEST: CPT

## 2021-12-13 PROCEDURE — 99213 OFFICE O/P EST LOW 20 MIN: CPT | Mod: 25

## 2021-12-13 NOTE — PHYSICAL EXAM
[Alert] : alert [Well Nourished] : well nourished [Healthy Appearance] : healthy appearance [No Acute Distress] : no acute distress [Normal Sclera/Conjunctiva] : normal sclera/conjunctiva [No Proptosis] : no proptosis [No Lid Lag] : no lid lag [No Respiratory Distress] : no respiratory distress [No Accessory Muscle Use] : no accessory muscle use [Normal Rate and Effort] : normal respiratory rate and effort [Normal Rate] : heart rate was normal [No Edema] : no peripheral edema [Spine Straight] : spine straight [No Stigmata of Cushings Syndrome] : no stigmata of Cushings Syndrome [Normal Gait] : normal gait [No Clubbing, Cyanosis] : no clubbing  or cyanosis of the fingernails [No Involuntary Movements] : no involuntary movements were seen [No Joint Swelling] : no joint swelling seen [No Rash] : no rash [No Tremors] : no tremors [Oriented x3] : oriented to person, place, and time

## 2021-12-13 NOTE — HISTORY OF PRESENT ILLNESS
[FreeTextEntry1] : 37 yo female, with type 1 DM since ; is not aware of complications; \par Humalog via [place] pump; Dexcom - is on Saji now; might need to change it to get coverage.\par SMBG - 17 times a day.\par BG is well controlled - \par POC BG - 151 mg/dl (immediately after she had a "green shake")\par POC HbA1C - 5.3%\par patient currently breastfeeding a qo mo old daughter.\par \par Basal rates:\par 12 am - 0.225\par 2:30 am - 0.425\par 5 am - 0.65\par 7 am - 0.4\par 11 am - 0.35\par 1 pm - 0.375\par 10 pm - 0.3\par i/c - 12 am -  ; 6 am - ;  6:30 pm - \par CF -  12 am - 1:60\par          6 am - 1:50\par          3 pm - 1:60\par IOB - 3 h\par has various time-related targets\par \par \par 17 MK\par Pt came for f/u\par Feels well; still breastfeeding.\par uses Dexcom - downloaded.\par \par Ophthalmology - due\par \par \par POC BG - 153 mg/dl\par \par 10/29/18 MK\par Came after a long absence.\par CC - needs new pump and CGM.\par States glycemic control id good.\par Goes through Juicing 5 days a month - hypoglycemia is usually during that time. Pt. sees Dr. Kevin, who monitors her as well. Pt has to meal- bolus herself ahead of time - is on Admelog; hypoglycemia might be related to that as well.\par Interested in learning more and getting Dexcom G-6 and Tandem pump.\par \par POC bg - 119 mg/dl\par POC A1C - 6.0%\par recent labs at PCP - will send them by fax to us.\par \par 19 MK\par Came for f/u.\par Feels that later her BG is higher than before.\par Uses Dexcom - report analyzed in detail.\par Requires adjustment of pump rates. Ready to switch for tandem - interested in Basal IQ technology.\par No other complains.\par \par 19 MK\par Came for f/u on type 1 DM.\par Feels well. Was transitioned to T-slim; waiting for Dexcom G-6 to start using Basal IQ.\par CGM report uploaded, patterns analyzed and discussed with pt.\par No new complains.\par Of note - considers getting a second child, maybe. \par \par 3/10/20 MK\par Started control IQ about a month ago; rates were adjusted by Dr. kevin - pt is very happy with control.\par CGM report uploaded, analyzed and discussed with pt.\par Recent labs from Dr. Ge - A1C - 6.4%; no hypoglycemia.\par Pt is concerned about her thyroid nodule, and f/u with US.\par No other changes or complains.\par \par 6/15/2021 Mk\par Pt needs help adjusting pump rates.\par C/O severe hypoglycemia 3 days ago - she changed the infusion site to a "virgin" one, after unsuccessfully overcorrecting for hyperglycemia;  and her BG dropped to 33 mg/dl, and then "low";\par she did not lose consciousness, and it took her "a long time' to bring it back.\par Her glucagon emergency kit ;\par recent A1C from outside lab was 6.3% (copy sent to us, and will be scanned to chart);\par CGM report downloaded and discussed with pt in detail.\par needs to change I/C.\par gained ~ 10 lb over the last year, as she stopped exercise due to covid.\par Lost her father last December.\par No other changes or complains.\par \par 2021 MK\par Came for f/u on DM management.\par Feels well;\par POC A1C - 6.3% \par CGM and pump reports downloaded, analyzed and discussed with pt.\par She plans to change her insurance and has questions about coverage.\par No other changes or complains.\par

## 2022-01-11 ENCOUNTER — TRANSCRIPTION ENCOUNTER (OUTPATIENT)
Age: 42
End: 2022-01-11

## 2022-01-12 ENCOUNTER — TRANSCRIPTION ENCOUNTER (OUTPATIENT)
Age: 42
End: 2022-01-12

## 2022-01-18 ENCOUNTER — TRANSCRIPTION ENCOUNTER (OUTPATIENT)
Age: 42
End: 2022-01-18

## 2022-02-18 ENCOUNTER — NON-APPOINTMENT (OUTPATIENT)
Age: 42
End: 2022-02-18

## 2022-02-18 ENCOUNTER — APPOINTMENT (OUTPATIENT)
Dept: INTERNAL MEDICINE | Facility: CLINIC | Age: 42
End: 2022-02-18
Payer: MEDICAID

## 2022-02-18 VITALS
HEART RATE: 79 BPM | SYSTOLIC BLOOD PRESSURE: 118 MMHG | WEIGHT: 147 LBS | RESPIRATION RATE: 16 BRPM | TEMPERATURE: 98.6 F | OXYGEN SATURATION: 98 % | HEIGHT: 68 IN | BODY MASS INDEX: 22.28 KG/M2 | DIASTOLIC BLOOD PRESSURE: 74 MMHG

## 2022-02-18 PROCEDURE — 99396 PREV VISIT EST AGE 40-64: CPT | Mod: 25

## 2022-02-18 PROCEDURE — G0008: CPT

## 2022-02-18 PROCEDURE — 90686 IIV4 VACC NO PRSV 0.5 ML IM: CPT

## 2022-02-18 RX ORDER — DEXAMETHASONE SODIUM PHOSPHATE 1 MG/ML
0.1 SOLUTION/ DROPS OPHTHALMIC TWICE DAILY
Qty: 1 | Refills: 0 | Status: COMPLETED | COMMUNITY
Start: 2021-10-25 | End: 2022-02-18

## 2022-02-18 RX ORDER — ASCORBIC ACID 125 MG
TABLET,CHEWABLE ORAL DAILY
Refills: 0 | Status: COMPLETED | COMMUNITY
End: 2022-02-18

## 2022-02-18 RX ORDER — CIPROFLOXACIN AND DEXAMETHASONE 3; 1 MG/ML; MG/ML
0.3-0.1 SUSPENSION/ DROPS AURICULAR (OTIC) TWICE DAILY
Qty: 1 | Refills: 0 | Status: COMPLETED | COMMUNITY
Start: 2021-10-25 | End: 2022-02-18

## 2022-02-18 RX ORDER — OFLOXACIN OTIC 3 MG/ML
0.3 SOLUTION AURICULAR (OTIC) TWICE DAILY
Qty: 1 | Refills: 0 | Status: COMPLETED | COMMUNITY
Start: 2021-10-25 | End: 2022-02-18

## 2022-02-18 RX ORDER — BLOOD-GLUCOSE,RECEIVER,CONT
EACH MISCELLANEOUS
Qty: 1 | Refills: 0 | Status: COMPLETED | COMMUNITY
Start: 2017-02-02 | End: 2022-02-18

## 2022-02-18 RX ORDER — HUMAN PAPILLOMAVIRUS 9-VALENT VACCINE, RECOMBINANT 30; 40; 60; 40; 20; 20; 20; 20; 20 UG/.5ML; UG/.5ML; UG/.5ML; UG/.5ML; UG/.5ML; UG/.5ML; UG/.5ML; UG/.5ML; UG/.5ML
INJECTION, SUSPENSION INTRAMUSCULAR
Qty: 1 | Refills: 2 | Status: DISCONTINUED | COMMUNITY
Start: 2021-05-04 | End: 2022-02-18

## 2022-02-18 RX ORDER — BLOOD-GLUCOSE TRANSMITTER
EACH MISCELLANEOUS
Qty: 1 | Refills: 0 | Status: COMPLETED | COMMUNITY
Start: 2017-02-02 | End: 2022-02-18

## 2022-02-18 RX ORDER — BLOOD-GLUCOSE SENSOR
EACH MISCELLANEOUS
Qty: 12 | Refills: 3 | Status: COMPLETED | COMMUNITY
Start: 2017-02-02 | End: 2022-02-18

## 2022-02-18 NOTE — ASSESSMENT
[FreeTextEntry1] : I personally discussed this patient with the Nurse Practitioner student at the time of the visit. I agree with the assessment and plan as written, unless noted below. \par I was present with the Nurse Practitioner during the key portions of the patient’s visit. I discussed the case with the Nurse Practitioner and agree with the findings and plan as documented in the Nurse Practitioner 's note, unless noted below.

## 2022-02-18 NOTE — PHYSICAL EXAM
[Normal] : affect was normal and insight and judgment were intact [de-identified] : rash inferior left eye

## 2022-02-21 ENCOUNTER — TRANSCRIPTION ENCOUNTER (OUTPATIENT)
Age: 42
End: 2022-02-21

## 2022-02-21 LAB
25(OH)D3 SERPL-MCNC: 37.3 NG/ML
ALBUMIN SERPL ELPH-MCNC: 4.4 G/DL
ALP BLD-CCNC: 57 U/L
ALT SERPL-CCNC: 16 U/L
ANION GAP SERPL CALC-SCNC: 11 MMOL/L
APPEARANCE: CLEAR
AST SERPL-CCNC: 31 U/L
BASOPHILS # BLD AUTO: 0.02 K/UL
BASOPHILS NFR BLD AUTO: 0.3 %
BILIRUB SERPL-MCNC: 0.6 MG/DL
BILIRUBIN URINE: NEGATIVE
BLOOD URINE: NEGATIVE
BUN SERPL-MCNC: 14 MG/DL
CALCIUM SERPL-MCNC: 9.2 MG/DL
CHLORIDE SERPL-SCNC: 103 MMOL/L
CHOLEST SERPL-MCNC: 149 MG/DL
CO2 SERPL-SCNC: 24 MMOL/L
COLOR: YELLOW
CREAT SERPL-MCNC: 0.85 MG/DL
EOSINOPHIL # BLD AUTO: 0.02 K/UL
EOSINOPHIL NFR BLD AUTO: 0.3 %
ESTIMATED AVERAGE GLUCOSE: 140 MG/DL
GLUCOSE QUALITATIVE U: NEGATIVE
GLUCOSE SERPL-MCNC: 253 MG/DL
HBA1C MFR BLD HPLC: 6.5 %
HCT VFR BLD CALC: 38.9 %
HDLC SERPL-MCNC: 86 MG/DL
HGB BLD-MCNC: 12.9 G/DL
IMM GRANULOCYTES NFR BLD AUTO: 0.3 %
KETONES URINE: NEGATIVE
LDLC SERPL CALC-MCNC: 53 MG/DL
LEUKOCYTE ESTERASE URINE: NEGATIVE
LYMPHOCYTES # BLD AUTO: 1.81 K/UL
LYMPHOCYTES NFR BLD AUTO: 27.1 %
MAN DIFF?: NORMAL
MCHC RBC-ENTMCNC: 32.1 PG
MCHC RBC-ENTMCNC: 33.2 GM/DL
MCV RBC AUTO: 96.8 FL
MONOCYTES # BLD AUTO: 0.5 K/UL
MONOCYTES NFR BLD AUTO: 7.5 %
NEUTROPHILS # BLD AUTO: 4.32 K/UL
NEUTROPHILS NFR BLD AUTO: 64.5 %
NITRITE URINE: NEGATIVE
NONHDLC SERPL-MCNC: 63 MG/DL
PH URINE: 7.5
PLATELET # BLD AUTO: 219 K/UL
POTASSIUM SERPL-SCNC: 4.6 MMOL/L
PROT SERPL-MCNC: 6.4 G/DL
PROTEIN URINE: NEGATIVE
RBC # BLD: 4.02 M/UL
RBC # FLD: 12.1 %
SODIUM SERPL-SCNC: 138 MMOL/L
SPECIFIC GRAVITY URINE: 1.02
TRIGL SERPL-MCNC: 50 MG/DL
TSH SERPL-ACNC: 1.03 UIU/ML
UROBILINOGEN URINE: NORMAL
WBC # FLD AUTO: 6.69 K/UL

## 2022-04-11 PROBLEM — Z11.59 SCREENING FOR VIRAL DISEASE: Status: RESOLVED | Noted: 2020-09-17 | Resolved: 2022-04-11

## 2022-04-13 NOTE — ED ADULT TRIAGE NOTE - MODE OF ARRIVAL
· Patient presented to ED with complaint of intermittent fevers and chills with shortness of breath  · Found to have multifocal pneumonia on CT scan, also with mild distal esophageal thickening, possibly indicating esophagitis    He is very quickly improving on IV Rocephin    He only has mild rhonchi now      Likely home in 1 to 2 days Walk in

## 2022-04-28 ENCOUNTER — RESULT REVIEW (OUTPATIENT)
Age: 42
End: 2022-04-28

## 2022-04-28 ENCOUNTER — APPOINTMENT (OUTPATIENT)
Dept: ULTRASOUND IMAGING | Facility: CLINIC | Age: 42
End: 2022-04-28
Payer: MEDICAID

## 2022-04-28 ENCOUNTER — OUTPATIENT (OUTPATIENT)
Dept: OUTPATIENT SERVICES | Facility: HOSPITAL | Age: 42
LOS: 1 days | End: 2022-04-28

## 2022-04-28 ENCOUNTER — APPOINTMENT (OUTPATIENT)
Dept: ULTRASOUND IMAGING | Facility: CLINIC | Age: 42
End: 2022-04-28

## 2022-04-28 ENCOUNTER — APPOINTMENT (OUTPATIENT)
Dept: MAMMOGRAPHY | Facility: CLINIC | Age: 42
End: 2022-04-28

## 2022-04-28 PROCEDURE — 76536 US EXAM OF HEAD AND NECK: CPT | Mod: 26

## 2022-04-28 PROCEDURE — 77067 SCR MAMMO BI INCL CAD: CPT | Mod: 26

## 2022-04-28 PROCEDURE — 77063 BREAST TOMOSYNTHESIS BI: CPT | Mod: 26

## 2022-04-28 PROCEDURE — 76641 ULTRASOUND BREAST COMPLETE: CPT | Mod: 26,50

## 2022-05-02 ENCOUNTER — TRANSCRIPTION ENCOUNTER (OUTPATIENT)
Age: 42
End: 2022-05-02

## 2022-05-03 ENCOUNTER — RESULT REVIEW (OUTPATIENT)
Age: 42
End: 2022-05-03

## 2022-05-03 ENCOUNTER — TRANSCRIPTION ENCOUNTER (OUTPATIENT)
Age: 42
End: 2022-05-03

## 2022-05-03 ENCOUNTER — OUTPATIENT (OUTPATIENT)
Dept: OUTPATIENT SERVICES | Facility: HOSPITAL | Age: 42
LOS: 1 days | End: 2022-05-03

## 2022-05-03 ENCOUNTER — APPOINTMENT (OUTPATIENT)
Dept: MAMMOGRAPHY | Facility: CLINIC | Age: 42
End: 2022-05-03
Payer: MEDICAID

## 2022-05-03 PROCEDURE — G0279: CPT | Mod: 26

## 2022-05-03 PROCEDURE — 77065 DX MAMMO INCL CAD UNI: CPT | Mod: 26,LT

## 2022-05-04 ENCOUNTER — TRANSCRIPTION ENCOUNTER (OUTPATIENT)
Age: 42
End: 2022-05-04

## 2022-05-16 ENCOUNTER — TRANSCRIPTION ENCOUNTER (OUTPATIENT)
Age: 42
End: 2022-05-16

## 2022-06-14 ENCOUNTER — APPOINTMENT (OUTPATIENT)
Dept: GASTROENTEROLOGY | Facility: CLINIC | Age: 42
End: 2022-06-14
Payer: MEDICAID

## 2022-06-14 VITALS
OXYGEN SATURATION: 100 % | SYSTOLIC BLOOD PRESSURE: 130 MMHG | HEART RATE: 78 BPM | HEIGHT: 68 IN | DIASTOLIC BLOOD PRESSURE: 86 MMHG | WEIGHT: 146 LBS | BODY MASS INDEX: 22.13 KG/M2 | TEMPERATURE: 97.9 F

## 2022-06-14 PROCEDURE — 99205 OFFICE O/P NEW HI 60 MIN: CPT

## 2022-06-14 NOTE — HISTORY OF PRESENT ILLNESS
[de-identified] : 42F with hx DM1 on insulin pump, glutenfree mostly dairyfree, thyroid nodule here to discuss constipation and enema use\par uses enemas when she does 5 day monthly juice cleanse for diabetes but for last month has basically been using enemas daily both coffee, saline and feels dependent on it\par stool rarely formed-  more bristol 5\par sometimes mucus strings\par sometimes super bloated after eating\par eats fish\par COFFEE ENEMA DEPENDENCE  came on with fasting- 5 day fast a month- green juice then she would get colonic or enema\par  with diagnosis of h pylori and concerned she may have it\par

## 2022-06-14 NOTE — ASSESSMENT
[FreeTextEntry1] : 42F with hx DM1 on insulin pump, glutenfree mostly dairyfree, thyroid nodule here to discuss constipation and enema use constipation may be related to prolonged restrictive eating also consider DM and check for other sources such as IBD, celiac, etc\par - labs, stool tests\par - counseled to stop using enemas and only use oral medications as it may lead to deterioration in functioning of the pelvic floor also avoid colonics\par - test h pylori\par - mg ox 500mg daily, flax, and other natural bowel movement support discussed\par - colonoscopy , r/b/a/i discussed and pt agreeable\par - consider pelvic floor PT if unable to wean off enemas\par - cousneled on travelers constipation and titrating miralax\par - f/u after above results

## 2022-06-14 NOTE — PHYSICAL EXAM
[General Appearance - Alert] : alert [General Appearance - In No Acute Distress] : in no acute distress [Sclera] : the sclera and conjunctiva were normal [Outer Ear] : the ears and nose were normal in appearance [Neck Appearance] : the appearance of the neck was normal [Heart Rate And Rhythm] : heart rate was normal and rhythm regular [Edema] : there was no peripheral edema [Bowel Sounds] : normal bowel sounds [Abdomen Soft] : soft [Abdomen Tenderness] : non-tender [Abdomen Mass (___ Cm)] : no abdominal mass palpated [Abnormal Walk] : normal gait [] : no rash [No Focal Deficits] : no focal deficits [Affect] : the affect was normal

## 2022-06-15 ENCOUNTER — LABORATORY RESULT (OUTPATIENT)
Age: 42
End: 2022-06-15

## 2022-06-20 ENCOUNTER — APPOINTMENT (OUTPATIENT)
Dept: ENDOCRINOLOGY | Facility: CLINIC | Age: 42
End: 2022-06-20
Payer: MEDICAID

## 2022-06-20 ENCOUNTER — TRANSCRIPTION ENCOUNTER (OUTPATIENT)
Age: 42
End: 2022-06-20

## 2022-06-20 VITALS
HEIGHT: 68 IN | BODY MASS INDEX: 22.43 KG/M2 | WEIGHT: 148 LBS | DIASTOLIC BLOOD PRESSURE: 80 MMHG | HEART RATE: 76 BPM | SYSTOLIC BLOOD PRESSURE: 126 MMHG

## 2022-06-20 LAB
25(OH)D3 SERPL-MCNC: 42.2 NG/ML
ALBUMIN SERPL ELPH-MCNC: 4.6 G/DL
ALP BLD-CCNC: 57 U/L
ALT SERPL-CCNC: 43 U/L
ANION GAP SERPL CALC-SCNC: 13 MMOL/L
AST SERPL-CCNC: 101 U/L
BASOPHILS # BLD AUTO: 0.03 K/UL
BASOPHILS NFR BLD AUTO: 0.5 %
BILIRUB SERPL-MCNC: 0.7 MG/DL
BUN SERPL-MCNC: 11 MG/DL
CALCIUM SERPL-MCNC: 9.5 MG/DL
CHLORIDE SERPL-SCNC: 101 MMOL/L
CO2 SERPL-SCNC: 24 MMOL/L
CREAT SERPL-MCNC: 0.87 MG/DL
CRP SERPL-MCNC: <3 MG/L
EGFR: 84 ML/MIN/1.73M2
EOSINOPHIL # BLD AUTO: 0.03 K/UL
EOSINOPHIL NFR BLD AUTO: 0.5 %
FERRITIN SERPL-MCNC: 47 NG/ML
FOLATE SERPL-MCNC: >20 NG/ML
GLUCOSE BLDC GLUCOMTR-MCNC: 107
GLUCOSE SERPL-MCNC: 129 MG/DL
HCT VFR BLD CALC: 41.9 %
HGB BLD-MCNC: 14.1 G/DL
IGA SER QL IEP: 334 MG/DL
IMM GRANULOCYTES NFR BLD AUTO: 0.3 %
IRON SATN MFR SERPL: 49 %
IRON SERPL-MCNC: 148 UG/DL
LYMPHOCYTES # BLD AUTO: 2.33 K/UL
LYMPHOCYTES NFR BLD AUTO: 37.4 %
MAN DIFF?: NORMAL
MCHC RBC-ENTMCNC: 32.4 PG
MCHC RBC-ENTMCNC: 33.7 GM/DL
MCV RBC AUTO: 96.3 FL
MONOCYTES # BLD AUTO: 0.44 K/UL
MONOCYTES NFR BLD AUTO: 7.1 %
NEUTROPHILS # BLD AUTO: 3.38 K/UL
NEUTROPHILS NFR BLD AUTO: 54.2 %
PLATELET # BLD AUTO: 202 K/UL
POTASSIUM SERPL-SCNC: 4.6 MMOL/L
PROT SERPL-MCNC: 7 G/DL
RBC # BLD: 4.35 M/UL
RBC # FLD: 12.1 %
SODIUM SERPL-SCNC: 138 MMOL/L
TIBC SERPL-MCNC: 302 UG/DL
TTG IGA SER IA-ACNC: <1.2 U/ML
TTG IGA SER-ACNC: NEGATIVE
TTG IGG SER IA-ACNC: 2.4 U/ML
TTG IGG SER IA-ACNC: NEGATIVE
UIBC SERPL-MCNC: 155 UG/DL
VIT B12 SERPL-MCNC: 1523 PG/ML
WBC # FLD AUTO: 6.23 K/UL

## 2022-06-20 PROCEDURE — 99214 OFFICE O/P EST MOD 30 MIN: CPT | Mod: 25

## 2022-06-20 PROCEDURE — 82962 GLUCOSE BLOOD TEST: CPT

## 2022-06-20 PROCEDURE — 83036 HEMOGLOBIN GLYCOSYLATED A1C: CPT | Mod: QW

## 2022-06-20 NOTE — HISTORY OF PRESENT ILLNESS
[FreeTextEntry1] : 39 y. o. female, not previously seen by me,  with a h/o DM since 2006.\par Is thought to have type 1 Dm, but C-peptide was still detectable in 5/2017 (0.4).\par Anti-islet cell and anti-TIM AB positive.\par She is using T-Slim insulin pump and CGM.\par BSs are overall in good control but she does experience mild hypoglycemic reactions occasionally, without LOC.\par She is not known to have retinopathy, neuropathy or nephropathy.\par She has a 3.5 y.o. child and is not planning any pregnancies in the immediate future.\par HbA1C today is 6.2%, glucose - 115 mg/dl.\par Her weight has been stable.\par About 1 yr ago she was found to have a thyroid nodule. FNAB was c/w [place] 4, but molecular studies were c/w low risk of cancer (4%). TFTs have been normal and anti-thyroid AB negative.\par 9/17/2020. The patient is doing well.\par She has gained 8 lb.\par Is now on tandem insulin pump.\par Has occasional mild hypoglycemia.\par HbA1C today is 6.1%, glucose - 172 mg/dl.\par Thyroid u/sound 9/11/2020 unchanged when compared to 12/3/2018.\par 6/20/22. The patient is doing well.\par She has lost 3 lb.\par She reports no hypoglycemia.\par Her last ophthalm. exam was recently, reportedly without any evidence of retinopathy.\par HbA1C today is 5.5%, glucose - 107 mg/dl.\par Last thyroid u/sound 4/28/22 - stable.

## 2022-06-20 NOTE — REASON FOR VISIT
[Follow - Up] : a follow-up visit [DM Type 1] : DM Type 1 [Insulin Pump] : insulin pump management [Thyroid nodule/ MNG] : thyroid nodule/ MNG

## 2022-06-20 NOTE — ASSESSMENT
[FreeTextEntry1] : DM, type 1, adequate glycemic control.\par Non- toxic MNG.\par \par Will continue current management.\par Medications refilled.\par F/U - 3 months.

## 2022-06-27 ENCOUNTER — TRANSCRIPTION ENCOUNTER (OUTPATIENT)
Age: 42
End: 2022-06-27

## 2022-08-09 ENCOUNTER — APPOINTMENT (OUTPATIENT)
Dept: ENDOCRINOLOGY | Facility: CLINIC | Age: 42
End: 2022-08-09

## 2022-08-09 ENCOUNTER — RESULT REVIEW (OUTPATIENT)
Age: 42
End: 2022-08-09

## 2022-08-09 VITALS
SYSTOLIC BLOOD PRESSURE: 131 MMHG | HEART RATE: 74 BPM | BODY MASS INDEX: 22.5 KG/M2 | WEIGHT: 148 LBS | DIASTOLIC BLOOD PRESSURE: 82 MMHG

## 2022-08-09 PROCEDURE — 99214 OFFICE O/P EST MOD 30 MIN: CPT | Mod: 25

## 2022-08-09 PROCEDURE — 10005 FNA BX W/US GDN 1ST LES: CPT

## 2022-08-09 NOTE — PROCEDURE
[Fine Needle Aspiration] : fine needle aspiration ~T ~C was performed [Risks] : risks [Benefits] : benefits [Alternatives] : alternatives [Consent Obtained] : written consent was obtained prior to the procedure and is detailed in the patient's record [Patient] : the patient [Ethyl Chloride] : ethyl chloride [Supine] : the patient was placed in the supine position with the neck extended as tolerated [Alcohol] : alcohol [25 gauge 1.5 inch] : A 25 gauge 1.5 inch needle was used [3 Passes] : 3 passes were made through the mass [Ultrasonic Guidance] : ultrasound guidance was employed [Sent to Histology] : the specimens were prepared in the usual manner and sent to cytopathologist [Tolerated Well] : the patient tolerated the procedure well [Vital Signs Stable] : the vital signs were stable [Hemostasis] : hemostasis was assured and the patient was discharged in satisfactory condition [No Complications] : there were no complications [Instructions Given] : handouts/patient instructions were given to patient [de-identified] : L lobe nodule [FreeTextEntry6] : Pending biopsy results

## 2022-08-09 NOTE — ASSESSMENT
[FreeTextEntry1] : Thyroid nodule:\par \par Left lower lobe nodule measuring 0.8 x 0.7 x 0.8 cm was successfully biopsied under sonographic guidance. An extra sample for PRN Afirma testing was obtained if indeterminate results are present. r/b/a to thyroid biopsy, management of thyroid nodules reviewed. Verbalized understanding and agrees with treatment plan, will contact MD and seek emergency medical care if condition changes.\par

## 2022-08-25 ENCOUNTER — APPOINTMENT (OUTPATIENT)
Dept: GASTROENTEROLOGY | Facility: CLINIC | Age: 42
End: 2022-08-25

## 2022-08-25 VITALS
DIASTOLIC BLOOD PRESSURE: 83 MMHG | SYSTOLIC BLOOD PRESSURE: 120 MMHG | BODY MASS INDEX: 24.25 KG/M2 | RESPIRATION RATE: 15 BRPM | HEART RATE: 77 BPM | OXYGEN SATURATION: 98 % | HEIGHT: 68 IN | TEMPERATURE: 97.8 F | WEIGHT: 160 LBS

## 2022-08-25 PROCEDURE — 99213 OFFICE O/P EST LOW 20 MIN: CPT

## 2022-08-26 LAB
ALBUMIN SERPL ELPH-MCNC: 4.3 G/DL
ALP BLD-CCNC: 68 U/L
ALT SERPL-CCNC: 24 U/L
ANION GAP SERPL CALC-SCNC: 9 MMOL/L
AST SERPL-CCNC: 43 U/L
BASOPHILS # BLD AUTO: 0.04 K/UL
BASOPHILS NFR BLD AUTO: 0.7 %
BILIRUB SERPL-MCNC: 0.5 MG/DL
BUN SERPL-MCNC: 11 MG/DL
CALCIUM SERPL-MCNC: 8.9 MG/DL
CHLORIDE SERPL-SCNC: 103 MMOL/L
CO2 SERPL-SCNC: 25 MMOL/L
CREAT SERPL-MCNC: 0.86 MG/DL
EGFR: 85 ML/MIN/1.73M2
EOSINOPHIL # BLD AUTO: 0.05 K/UL
EOSINOPHIL NFR BLD AUTO: 0.9 %
GLUCOSE SERPL-MCNC: 127 MG/DL
HCT VFR BLD CALC: 37.7 %
HGB BLD-MCNC: 13.3 G/DL
IMM GRANULOCYTES NFR BLD AUTO: 0.3 %
LYMPHOCYTES # BLD AUTO: 1.84 K/UL
LYMPHOCYTES NFR BLD AUTO: 31.9 %
MAN DIFF?: NORMAL
MCHC RBC-ENTMCNC: 33.3 PG
MCHC RBC-ENTMCNC: 35.3 GM/DL
MCV RBC AUTO: 94.5 FL
MONOCYTES # BLD AUTO: 0.46 K/UL
MONOCYTES NFR BLD AUTO: 8 %
NEUTROPHILS # BLD AUTO: 3.36 K/UL
NEUTROPHILS NFR BLD AUTO: 58.2 %
PLATELET # BLD AUTO: 220 K/UL
POTASSIUM SERPL-SCNC: 5.4 MMOL/L
PROT SERPL-MCNC: 6.8 G/DL
RBC # BLD: 3.99 M/UL
RBC # FLD: 12.1 %
SODIUM SERPL-SCNC: 138 MMOL/L
WBC # FLD AUTO: 5.77 K/UL

## 2022-08-29 NOTE — HISTORY OF PRESENT ILLNESS
[de-identified] : 42F with hx DM1 on insulin pump, gluten free mostly dairy free, thyroid nodule here for colonoscopy clearance. Pt was seen by Dr. Collier in 6/2022 c/o constipation, bloating, incomplete evacuation and long strings of mucus in stool. She was using a coffee enema daily to help alleviate constipation.  She reports she stopped using enemas since advised last visit by Dr. Collier.  She reports her BM have been more regular since d/c the enemas and taking Miralax once daily in addition to Magnesium oxide.\par \par She was traveling in [facility] for the month of June and was eating gluten and noticed a rash on her right middle finger- concerned it was a celiac rash- was seen by derm and on was told rash does not correlate with dermatitis herpetiformis. She gained  12 lb during her trip to [facility]. She plans to start a green juice cleanse starting today. She is very active and jogs approx 4 miles daily \par \par 7/14/22- , will repeat level today (reports had a glass of wine last night)\par \par \par \par

## 2022-08-29 NOTE — ASSESSMENT
[FreeTextEntry1] : 42F with hx DM1 on insulin pump, gluten free mostly dairy free, thyroid nodule here for colonoscopy screening.\par \par \par Constipation\par - counseled to stop using enemas and only use oral medications as it may lead to deterioration in functioning of the pelvic floor also avoid colonics\par - Continue Miralax packet daily\par - Continue mg ox 500mg daily, flax, and other natural bowel movement support discussed\par - colonoscopy , r/b/a/i discussed and pt agreeable\par - consider pelvic floor PT if unable to wean off enemas\par \par Elevated AST\par - Repeat COMP\par - D/c importance of limiting alcohol consumption, reports drinks 1 glass of wine daily

## 2022-09-19 ENCOUNTER — NON-APPOINTMENT (OUTPATIENT)
Age: 42
End: 2022-09-19

## 2022-09-28 ENCOUNTER — APPOINTMENT (OUTPATIENT)
Dept: GASTROENTEROLOGY | Facility: CLINIC | Age: 42
End: 2022-09-28

## 2022-10-02 ENCOUNTER — NON-APPOINTMENT (OUTPATIENT)
Age: 42
End: 2022-10-02

## 2022-10-03 ENCOUNTER — APPOINTMENT (OUTPATIENT)
Age: 42
End: 2022-10-03

## 2022-10-03 PROCEDURE — 45385 COLONOSCOPY W/LESION REMOVAL: CPT

## 2022-10-13 ENCOUNTER — TRANSCRIPTION ENCOUNTER (OUTPATIENT)
Age: 42
End: 2022-10-13

## 2022-10-19 ENCOUNTER — APPOINTMENT (OUTPATIENT)
Dept: GASTROENTEROLOGY | Facility: HOSPITAL | Age: 42
End: 2022-10-19

## 2022-10-19 PROCEDURE — 99214 OFFICE O/P EST MOD 30 MIN: CPT | Mod: 95

## 2022-10-19 NOTE — HISTORY OF PRESENT ILLNESS
[Home] : at home, [unfilled] , at the time of the visit. [Medical Office: ([facility])___] : at the medical office located in  [Verbal consent obtained from patient] : the patient, [unfilled] [de-identified] : 42F with hx DM1 on insulin pump, glutenfree mostly dairyfree, thyroid nodule here to discuss constipation and enema use\par 10/19/22\par felt great after cscope \par scope with BBPS 9, just internal hemorrhoids\par on mg citrate 500 daily, miralax\par daily probiotics in food\par \par PREVIOUS HX\par uses enemas when she does 5 day monthly juice cleanse for diabetes but for last month has basically been using enemas daily both coffee, saline and feels dependent on it\par stool rarely formed-  more bristol 5\par sometimes mucus strings\par sometimes super bloated after eating\par eats fish\par COFFEE ENEMA DEPENDENCE  came on with fasting- 5 day fast a month- green juice then she would get colonic or enema\par  with diagnosis of h pylori and concerned she may have it\par

## 2022-10-19 NOTE — ASSESSMENT
[FreeTextEntry1] : 42F with hx DM1 on insulin pump, glutenfree mostly dairyfree, thyroid nodule here to discuss constipation and enema use constipation now OFF ENEMAS and on miralax, magnesium and supportive care\par - continue to optimize bms\par - pelvic floor pt\par - squatty potty\par - mg ox 500mg daily, flax, and other natural bowel movement support discussed\par - colonoscopy in 10 years 2032\par - cousneled on travelers constipation and titrating miralax\par - f/u 3mo

## 2022-11-23 ENCOUNTER — APPOINTMENT (OUTPATIENT)
Dept: OPHTHALMOLOGY | Facility: CLINIC | Age: 42
End: 2022-11-23

## 2022-12-05 ENCOUNTER — TRANSCRIPTION ENCOUNTER (OUTPATIENT)
Age: 42
End: 2022-12-05

## 2022-12-06 ENCOUNTER — TRANSCRIPTION ENCOUNTER (OUTPATIENT)
Age: 42
End: 2022-12-06

## 2022-12-06 RX ORDER — INSULIN LISPRO 100 U/ML
100 INJECTION, SOLUTION INTRAVENOUS; SUBCUTANEOUS
Qty: 20 | Refills: 11 | Status: DISCONTINUED | COMMUNITY
Start: 2019-03-29 | End: 2022-12-06

## 2022-12-07 ENCOUNTER — TRANSCRIPTION ENCOUNTER (OUTPATIENT)
Age: 42
End: 2022-12-07

## 2022-12-13 ENCOUNTER — APPOINTMENT (OUTPATIENT)
Dept: OPHTHALMOLOGY | Facility: CLINIC | Age: 42
End: 2022-12-13

## 2022-12-13 ENCOUNTER — NON-APPOINTMENT (OUTPATIENT)
Age: 42
End: 2022-12-13

## 2022-12-13 PROCEDURE — 92201 OPSCPY EXTND RTA DRAW UNI/BI: CPT

## 2022-12-13 PROCEDURE — 92134 CPTRZ OPH DX IMG PST SGM RTA: CPT

## 2022-12-13 PROCEDURE — 92014 COMPRE OPH EXAM EST PT 1/>: CPT

## 2022-12-21 ENCOUNTER — NON-APPOINTMENT (OUTPATIENT)
Age: 42
End: 2022-12-21

## 2022-12-21 ENCOUNTER — TRANSCRIPTION ENCOUNTER (OUTPATIENT)
Age: 42
End: 2022-12-21

## 2023-01-04 ENCOUNTER — TRANSCRIPTION ENCOUNTER (OUTPATIENT)
Age: 43
End: 2023-01-04

## 2023-01-05 ENCOUNTER — TRANSCRIPTION ENCOUNTER (OUTPATIENT)
Age: 43
End: 2023-01-05

## 2023-01-12 ENCOUNTER — EMERGENCY (EMERGENCY)
Facility: HOSPITAL | Age: 43
LOS: 1 days | Discharge: HOME | End: 2023-01-12
Admitting: EMERGENCY MEDICINE
Payer: MEDICAID

## 2023-01-12 VITALS
DIASTOLIC BLOOD PRESSURE: 91 MMHG | HEART RATE: 77 BPM | SYSTOLIC BLOOD PRESSURE: 148 MMHG | TEMPERATURE: 98 F | WEIGHT: 141.1 LBS | OXYGEN SATURATION: 96 % | RESPIRATION RATE: 18 BRPM | HEIGHT: 68 IN

## 2023-01-12 DIAGNOSIS — E10.9 TYPE 1 DIABETES MELLITUS WITHOUT COMPLICATIONS: ICD-10-CM

## 2023-01-12 DIAGNOSIS — R10.12 LEFT UPPER QUADRANT PAIN: ICD-10-CM

## 2023-01-12 DIAGNOSIS — Z20.822 CONTACT WITH AND (SUSPECTED) EXPOSURE TO COVID-19: ICD-10-CM

## 2023-01-12 LAB
ALBUMIN SERPL ELPH-MCNC: 3.5 G/DL — SIGNIFICANT CHANGE UP (ref 3.4–5)
ALP SERPL-CCNC: 75 U/L — SIGNIFICANT CHANGE UP (ref 40–120)
ALT FLD-CCNC: 17 U/L — SIGNIFICANT CHANGE UP (ref 12–42)
ANION GAP SERPL CALC-SCNC: 8 MMOL/L — LOW (ref 9–16)
APPEARANCE UR: CLEAR — SIGNIFICANT CHANGE UP
APTT BLD: 32.7 SEC — SIGNIFICANT CHANGE UP (ref 27.5–35.5)
AST SERPL-CCNC: 28 U/L — SIGNIFICANT CHANGE UP (ref 15–37)
BASOPHILS # BLD AUTO: 0.02 K/UL — SIGNIFICANT CHANGE UP (ref 0–0.2)
BASOPHILS NFR BLD AUTO: 0.3 % — SIGNIFICANT CHANGE UP (ref 0–2)
BILIRUB SERPL-MCNC: 0.9 MG/DL — SIGNIFICANT CHANGE UP (ref 0.2–1.2)
BILIRUB UR-MCNC: NEGATIVE — SIGNIFICANT CHANGE UP
BUN SERPL-MCNC: 8 MG/DL — SIGNIFICANT CHANGE UP (ref 7–23)
CALCIUM SERPL-MCNC: 8.9 MG/DL — SIGNIFICANT CHANGE UP (ref 8.5–10.5)
CHLORIDE SERPL-SCNC: 101 MMOL/L — SIGNIFICANT CHANGE UP (ref 96–108)
CO2 SERPL-SCNC: 26 MMOL/L — SIGNIFICANT CHANGE UP (ref 22–31)
COLOR SPEC: YELLOW — SIGNIFICANT CHANGE UP
CREAT SERPL-MCNC: 0.82 MG/DL — SIGNIFICANT CHANGE UP (ref 0.5–1.3)
DIFF PNL FLD: NEGATIVE — SIGNIFICANT CHANGE UP
EGFR: 90 ML/MIN/1.73M2 — SIGNIFICANT CHANGE UP
EOSINOPHIL # BLD AUTO: 0.02 K/UL — SIGNIFICANT CHANGE UP (ref 0–0.5)
EOSINOPHIL NFR BLD AUTO: 0.3 % — SIGNIFICANT CHANGE UP (ref 0–6)
GLUCOSE SERPL-MCNC: 153 MG/DL — HIGH (ref 70–99)
GLUCOSE UR QL: NEGATIVE — SIGNIFICANT CHANGE UP
HCG SERPL-ACNC: <1 MIU/ML — SIGNIFICANT CHANGE UP
HCG UR QL: NEGATIVE — SIGNIFICANT CHANGE UP
HCT VFR BLD CALC: 38.3 % — SIGNIFICANT CHANGE UP (ref 34.5–45)
HGB BLD-MCNC: 13.2 G/DL — SIGNIFICANT CHANGE UP (ref 11.5–15.5)
IMM GRANULOCYTES NFR BLD AUTO: 0.3 % — SIGNIFICANT CHANGE UP (ref 0–0.9)
INR BLD: 1.04 — SIGNIFICANT CHANGE UP (ref 0.88–1.16)
KETONES UR-MCNC: NEGATIVE — SIGNIFICANT CHANGE UP
LACTATE SERPL-SCNC: 0.6 MMOL/L — SIGNIFICANT CHANGE UP (ref 0.4–2)
LEUKOCYTE ESTERASE UR-ACNC: NEGATIVE — SIGNIFICANT CHANGE UP
LIDOCAIN IGE QN: 50 U/L — LOW (ref 73–393)
LYMPHOCYTES # BLD AUTO: 1.08 K/UL — SIGNIFICANT CHANGE UP (ref 1–3.3)
LYMPHOCYTES # BLD AUTO: 16.5 % — SIGNIFICANT CHANGE UP (ref 13–44)
MAGNESIUM SERPL-MCNC: 1.9 MG/DL — SIGNIFICANT CHANGE UP (ref 1.6–2.6)
MCHC RBC-ENTMCNC: 32.7 PG — SIGNIFICANT CHANGE UP (ref 27–34)
MCHC RBC-ENTMCNC: 34.5 GM/DL — SIGNIFICANT CHANGE UP (ref 32–36)
MCV RBC AUTO: 94.8 FL — SIGNIFICANT CHANGE UP (ref 80–100)
MONOCYTES # BLD AUTO: 0.7 K/UL — SIGNIFICANT CHANGE UP (ref 0–0.9)
MONOCYTES NFR BLD AUTO: 10.7 % — SIGNIFICANT CHANGE UP (ref 2–14)
NEUTROPHILS # BLD AUTO: 4.72 K/UL — SIGNIFICANT CHANGE UP (ref 1.8–7.4)
NEUTROPHILS NFR BLD AUTO: 71.9 % — SIGNIFICANT CHANGE UP (ref 43–77)
NITRITE UR-MCNC: NEGATIVE — SIGNIFICANT CHANGE UP
NRBC # BLD: 0 /100 WBCS — SIGNIFICANT CHANGE UP (ref 0–0)
PH UR: 6.5 — SIGNIFICANT CHANGE UP (ref 5–8)
PLATELET # BLD AUTO: 196 K/UL — SIGNIFICANT CHANGE UP (ref 150–400)
POTASSIUM SERPL-MCNC: 4.9 MMOL/L — SIGNIFICANT CHANGE UP (ref 3.5–5.3)
POTASSIUM SERPL-SCNC: 4.9 MMOL/L — SIGNIFICANT CHANGE UP (ref 3.5–5.3)
PROT SERPL-MCNC: 7 G/DL — SIGNIFICANT CHANGE UP (ref 6.4–8.2)
PROT UR-MCNC: NEGATIVE MG/DL — SIGNIFICANT CHANGE UP
PROTHROM AB SERPL-ACNC: 12.2 SEC — SIGNIFICANT CHANGE UP (ref 10.5–13.4)
RBC # BLD: 4.04 M/UL — SIGNIFICANT CHANGE UP (ref 3.8–5.2)
RBC # FLD: 11.7 % — SIGNIFICANT CHANGE UP (ref 10.3–14.5)
SARS-COV-2 RNA SPEC QL NAA+PROBE: SIGNIFICANT CHANGE UP
SODIUM SERPL-SCNC: 135 MMOL/L — SIGNIFICANT CHANGE UP (ref 132–145)
SP GR SPEC: <=1.005 — SIGNIFICANT CHANGE UP (ref 1–1.03)
TROPONIN I, HIGH SENSITIVITY RESULT: 4 NG/L — SIGNIFICANT CHANGE UP
UROBILINOGEN FLD QL: 0.2 E.U./DL — SIGNIFICANT CHANGE UP
WBC # BLD: 6.56 K/UL — SIGNIFICANT CHANGE UP (ref 3.8–10.5)
WBC # FLD AUTO: 6.56 K/UL — SIGNIFICANT CHANGE UP (ref 3.8–10.5)

## 2023-01-12 PROCEDURE — 99284 EMERGENCY DEPT VISIT MOD MDM: CPT

## 2023-01-12 PROCEDURE — 74176 CT ABD & PELVIS W/O CONTRAST: CPT | Mod: 26

## 2023-01-12 RX ORDER — FAMOTIDINE 10 MG/ML
20 INJECTION INTRAVENOUS ONCE
Refills: 0 | Status: COMPLETED | OUTPATIENT
Start: 2023-01-12 | End: 2023-01-12

## 2023-01-12 RX ORDER — ONDANSETRON 8 MG/1
1 TABLET, FILM COATED ORAL
Qty: 12 | Refills: 0
Start: 2023-01-12 | End: 2023-01-15

## 2023-01-12 RX ORDER — SUCRALFATE 1 G
1 TABLET ORAL
Qty: 10 | Refills: 0
Start: 2023-01-12 | End: 2023-01-21

## 2023-01-12 RX ORDER — ONDANSETRON 8 MG/1
4 TABLET, FILM COATED ORAL ONCE
Refills: 0 | Status: COMPLETED | OUTPATIENT
Start: 2023-01-12 | End: 2023-01-12

## 2023-01-12 RX ORDER — FAMOTIDINE 10 MG/ML
1 INJECTION INTRAVENOUS
Qty: 10 | Refills: 0
Start: 2023-01-12 | End: 2023-01-16

## 2023-01-12 RX ORDER — SODIUM CHLORIDE 9 MG/ML
1000 INJECTION INTRAMUSCULAR; INTRAVENOUS; SUBCUTANEOUS ONCE
Refills: 0 | Status: COMPLETED | OUTPATIENT
Start: 2023-01-12 | End: 2023-01-12

## 2023-01-12 RX ORDER — ACETAMINOPHEN 500 MG
650 TABLET ORAL ONCE
Refills: 0 | Status: COMPLETED | OUTPATIENT
Start: 2023-01-12 | End: 2023-01-12

## 2023-01-12 RX ORDER — SUCRALFATE 1 G
1 TABLET ORAL ONCE
Refills: 0 | Status: COMPLETED | OUTPATIENT
Start: 2023-01-12 | End: 2023-01-12

## 2023-01-12 RX ADMIN — Medication 650 MILLIGRAM(S): at 09:41

## 2023-01-12 RX ADMIN — Medication 1 GRAM(S): at 09:41

## 2023-01-12 RX ADMIN — FAMOTIDINE 20 MILLIGRAM(S): 10 INJECTION INTRAVENOUS at 09:42

## 2023-01-12 RX ADMIN — SODIUM CHLORIDE 1000 MILLILITER(S): 9 INJECTION INTRAMUSCULAR; INTRAVENOUS; SUBCUTANEOUS at 09:41

## 2023-01-12 RX ADMIN — Medication 30 MILLILITER(S): at 09:42

## 2023-01-12 RX ADMIN — ONDANSETRON 4 MILLIGRAM(S): 8 TABLET, FILM COATED ORAL at 09:42

## 2023-01-12 NOTE — ED ADULT NURSE NOTE - OBJECTIVE STATEMENT
abd pain radiating to back since monday, no s/s of distress. Pt with pmh of dm-1, insulin pump in place, controlled by pt.

## 2023-01-12 NOTE — ED ADULT TRIAGE NOTE - CHIEF COMPLAINT QUOTE
Pt walked in c/o of left sided abdominal pain radiating to left flank worsening after eating x 4 days. Denies fever and n/v/d. PMH Type 1 DM

## 2023-01-12 NOTE — ED ADULT NURSE NOTE - NSICDXPASTMEDICALHX_GEN_ALL_CORE_FT
PAST MEDICAL HISTORY:  DM type 1 (diabetes mellitus, type 1)     No pertinent past medical history

## 2023-01-12 NOTE — ED PROVIDER NOTE - CLINICAL SUMMARY MEDICAL DECISION MAKING FREE TEXT BOX
42 y.o female here with LUQ pain x 4days worsening with eating.   Exam abd soft nontender, Left CVA TTP     gastritis vs PUD vs Pancreatitis     Plan: Symptomatic control, CT renal stone hunt, IVF, labs

## 2023-01-12 NOTE — ED PROVIDER NOTE - OBJECTIVE STATEMENT
43 y/o female hx of DM 1 now here with LUQ discomfort x 4 days. Denies fever, chills, hematuria, vaginal bleeding, d/c, change in bowel function, flank pain, rash, HA, dizziness, SOB, CP, palpitations, diaphoresis, cough, and malaise. Patient states the pain worsens with eating. Has appointment with GI MD next week.   Appears well NAD stable.

## 2023-01-12 NOTE — ED PROVIDER NOTE - ALLERGIC/IMMUNOLOGIC NEGATIVE STATEMENT, MLM
yes no dermatitis, no environmental allergies, no food allergies, no immunosuppressive disorder, and no pruritus.

## 2023-01-12 NOTE — ED PROVIDER NOTE - PATIENT PORTAL LINK FT
You can access the FollowMyHealth Patient Portal offered by [facility] by registering at the following website: [email or web address]. By joining [facility] [facility]’s FollowMyHealth portal, you will also be able to view your health information using other applications (apps) compatible with our system.

## 2023-01-12 NOTE — ED PROVIDER NOTE - NSCAREINITIATED _GEN_ER
well developed, well nourished , in no acute distress , ambulating without difficulty , normal communication ability
Jermaine Bauer(PA)

## 2023-01-14 LAB
CULTURE RESULTS: SIGNIFICANT CHANGE UP
SPECIMEN SOURCE: SIGNIFICANT CHANGE UP

## 2023-01-18 PROBLEM — E10.9 TYPE 1 DIABETES MELLITUS WITHOUT COMPLICATIONS: Chronic | Status: ACTIVE | Noted: 2023-01-12

## 2023-01-19 ENCOUNTER — APPOINTMENT (OUTPATIENT)
Dept: GASTROENTEROLOGY | Facility: CLINIC | Age: 43
End: 2023-01-19
Payer: MEDICAID

## 2023-01-19 VITALS
DIASTOLIC BLOOD PRESSURE: 80 MMHG | OXYGEN SATURATION: 98 % | BODY MASS INDEX: 21.37 KG/M2 | HEART RATE: 84 BPM | TEMPERATURE: 95.8 F | HEIGHT: 68 IN | RESPIRATION RATE: 14 BRPM | WEIGHT: 141 LBS | SYSTOLIC BLOOD PRESSURE: 110 MMHG

## 2023-01-19 PROCEDURE — 99215 OFFICE O/P EST HI 40 MIN: CPT

## 2023-01-19 NOTE — HISTORY OF PRESENT ILLNESS
[de-identified] : 42F with hx DM1 on insulin pump, glutenfree mostly dairyfree, thyroid nodule here to discuss constipation and enema use\par \par 1/19/22\par - inspira monday \par - famotidine, sucralfate, zofran. gas pain better now \par - CT scan, told maybe needs egd \par - in morning will have epresso. takes maalox and goes for a run \par - still taking miralax every morning \par - stool is loose now with these meds when was really regulr with pt and miralax \par - burping a lot \par 10/19/22\par felt great after cscope \par scope with BBPS 9, just internal hemorrhoids\par on mg citrate 500 daily, miralax\par daily probiotics in food\par \par PREVIOUS HX\par uses enemas when she does 5 day monthly juice cleanse for diabetes but for last month has basically been using enemas daily both coffee, saline and feels dependent on it\par stool rarely formed-  more bristol 5\par sometimes mucus strings\par sometimes super bloated after eating\par eats fish\par COFFEE ENEMA DEPENDENCE  came on with fasting- 5 day fast a month- green juice then she would get colonic or enema\par  with diagnosis of h pylori and concerned she may have it\par

## 2023-01-19 NOTE — ASSESSMENT
[FreeTextEntry1] : 42F with hx DM1 on insulin pump, glutenfree mostly dairyfree, thyroid nodule here to discuss constipation and enema use constipation now OFF ENEMAS and on miralax, magnesium and supportive care presents after ED visit for ACUTE SEVERE LUQ ABD PAIN and BELCHING with partial response to pepcid, mylanta, carafate\par - EGD r/b/a/i discussed and patient agreeable\par - stool tests for loose stool \par - continue to optimize bms\par - pelvic floor pt\par - squatty potty\par - mg ox 500mg daily, flax, and other natural bowel movement support discussed\par - colonoscopy in 10 years 2032\par - cousneled on travelers constipation and titrating miralax\par - f/u after egd

## 2023-01-19 NOTE — END OF VISIT
[FreeTextEntry3] : seen and discussed with Yael Beck NP\par  [Time Spent: ___ minutes] : I have spent [unfilled] minutes of time on the encounter.

## 2023-01-20 LAB — SARS-COV-2 N GENE NPH QL NAA+PROBE: NOT DETECTED

## 2023-01-23 ENCOUNTER — APPOINTMENT (OUTPATIENT)
Age: 43
End: 2023-01-23
Payer: MEDICAID

## 2023-01-23 ENCOUNTER — RESULT REVIEW (OUTPATIENT)
Age: 43
End: 2023-01-23

## 2023-01-23 PROCEDURE — 43239 EGD BIOPSY SINGLE/MULTIPLE: CPT

## 2023-01-24 ENCOUNTER — TRANSCRIPTION ENCOUNTER (OUTPATIENT)
Age: 43
End: 2023-01-24

## 2023-01-24 LAB — GI PCR PANEL: NOT DETECTED

## 2023-02-05 ENCOUNTER — TRANSCRIPTION ENCOUNTER (OUTPATIENT)
Age: 43
End: 2023-02-05

## 2023-02-05 LAB — CALPROTECTIN FECAL: <16 UG/G

## 2023-02-10 ENCOUNTER — APPOINTMENT (OUTPATIENT)
Dept: GASTROENTEROLOGY | Facility: CLINIC | Age: 43
End: 2023-02-10
Payer: MEDICAID

## 2023-02-10 PROCEDURE — 99443: CPT

## 2023-02-17 ENCOUNTER — APPOINTMENT (OUTPATIENT)
Dept: INTERNAL MEDICINE | Facility: CLINIC | Age: 43
End: 2023-02-17

## 2023-03-06 ENCOUNTER — APPOINTMENT (OUTPATIENT)
Dept: INTERNAL MEDICINE | Facility: CLINIC | Age: 43
End: 2023-03-06
Payer: MEDICAID

## 2023-03-06 ENCOUNTER — NON-APPOINTMENT (OUTPATIENT)
Age: 43
End: 2023-03-06

## 2023-03-06 VITALS
DIASTOLIC BLOOD PRESSURE: 75 MMHG | WEIGHT: 150 LBS | SYSTOLIC BLOOD PRESSURE: 126 MMHG | HEIGHT: 68 IN | HEART RATE: 70 BPM | TEMPERATURE: 98.2 F | BODY MASS INDEX: 22.73 KG/M2 | OXYGEN SATURATION: 98 %

## 2023-03-06 PROCEDURE — 90471 IMMUNIZATION ADMIN: CPT

## 2023-03-06 PROCEDURE — 99396 PREV VISIT EST AGE 40-64: CPT | Mod: 25

## 2023-03-06 PROCEDURE — 90715 TDAP VACCINE 7 YRS/> IM: CPT

## 2023-03-06 RX ORDER — INSULIN LISPRO 100 [IU]/ML
100 INJECTION, SOLUTION INTRAVENOUS; SUBCUTANEOUS
Qty: 3 | Refills: 11 | Status: COMPLETED | COMMUNITY
Start: 2022-01-11 | End: 2023-03-06

## 2023-03-06 RX ORDER — KETOCONAZOLE 20.5 MG/ML
2 SHAMPOO, SUSPENSION TOPICAL
Qty: 1 | Refills: 3 | Status: COMPLETED | COMMUNITY
Start: 2019-03-27 | End: 2023-03-06

## 2023-03-06 RX ORDER — INSULIN PUMP CARTRIDGE
CARTRIDGE (EA) SUBCUTANEOUS
Qty: 45 | Refills: 3 | Status: COMPLETED | COMMUNITY
Start: 2017-02-02 | End: 2023-03-06

## 2023-03-06 RX ORDER — POLYETHYLENE GLYCOL 3350 AND ELECTROLYTES WITH LEMON FLAVOR 236; 22.74; 6.74; 5.86; 2.97 G/4L; G/4L; G/4L; G/4L; G/4L
236 POWDER, FOR SOLUTION ORAL
Qty: 1 | Refills: 0 | Status: COMPLETED | COMMUNITY
Start: 2022-10-02 | End: 2023-03-06

## 2023-03-06 RX ORDER — GLUCAGON INJECTION, SOLUTION 1 MG/.2ML
1 INJECTION, SOLUTION SUBCUTANEOUS
Qty: 1 | Refills: 3 | Status: COMPLETED | COMMUNITY
Start: 2021-06-15 | End: 2023-03-06

## 2023-03-06 RX ORDER — SUCRALFATE 1 G/1
1 TABLET ORAL
Qty: 90 | Refills: 2 | Status: COMPLETED | COMMUNITY
Start: 2023-01-19 | End: 2023-03-06

## 2023-03-06 RX ORDER — BLOOD-GLUCOSE,RECEIVER,CONT
EACH MISCELLANEOUS
Qty: 1 | Refills: 1 | Status: COMPLETED | COMMUNITY
Start: 2022-01-11 | End: 2023-03-06

## 2023-03-06 RX ORDER — POLYETHYLENE GLYCOL 3350 AND ELECTROLYTES WITH LEMON FLAVOR 236; 22.74; 6.74; 5.86; 2.97 G/4L; G/4L; G/4L; G/4L; G/4L
236 POWDER, FOR SOLUTION ORAL
Qty: 1 | Refills: 0 | Status: COMPLETED | COMMUNITY
Start: 2022-08-25 | End: 2023-03-06

## 2023-03-06 RX ORDER — INFUSION SET FOR INSULIN PUMP
INFUSION SETS-PARAPHERNALIA MISCELLANEOUS
Qty: 45 | Refills: 3 | Status: COMPLETED | COMMUNITY
Start: 2017-02-02 | End: 2023-03-06

## 2023-03-06 RX ORDER — FAMOTIDINE 20 MG/1
20 TABLET, FILM COATED ORAL
Qty: 90 | Refills: 0 | Status: COMPLETED | COMMUNITY
Start: 2023-01-19 | End: 2023-03-06

## 2023-03-06 RX ORDER — MAGNESIUM OXIDE 500 MG
500 TABLET ORAL DAILY
Qty: 90 | Refills: 1 | Status: ACTIVE | COMMUNITY
Start: 2023-03-06

## 2023-03-06 RX ORDER — FACIAL-BODY WIPES
EACH TOPICAL
Qty: 90 | Refills: 3 | Status: COMPLETED | COMMUNITY
Start: 2017-02-02 | End: 2023-03-06

## 2023-03-06 RX ORDER — BLOOD-GLUCOSE SENSOR
EACH MISCELLANEOUS
Qty: 3 | Refills: 11 | Status: COMPLETED | COMMUNITY
Start: 2019-02-26 | End: 2023-03-06

## 2023-03-21 ENCOUNTER — TRANSCRIPTION ENCOUNTER (OUTPATIENT)
Age: 43
End: 2023-03-21

## 2023-03-21 NOTE — HISTORY OF PRESENT ILLNESS
[FreeTextEntry1] : CPE, establish care [de-identified] : Last month had a lot of gas with various foods, had EGD\par \par Vegan.\par \par endocrinologist: Dr. Zhane Kevin at [facility], sees her q 3 months. Goes to [facility] for labs\par \par 2/22/23: TSH 0.7\par CMP WNL\par A1c 5.4\par \par vit D 4000 IU daily\par \par insomnia: recently increased from melatonin 5 mg to 10 mg\par \par recent appt with gyn Dr. Earnestine Fink but she moved to the [place]. Had IUD removal. Looking for new gyn not in the [place]\par \par UTD on ophtho

## 2023-03-21 NOTE — PHYSICAL EXAM
[No Acute Distress] : no acute distress [Well Nourished] : well nourished [Well Developed] : well developed [Well-Appearing] : well-appearing [Normal Sclera/Conjunctiva] : normal sclera/conjunctiva [PERRL] : pupils equal round and reactive to light [Normal Outer Ear/Nose] : the outer ears and nose were normal in appearance [Normal Oropharynx] : the oropharynx was normal [No JVD] : no jugular venous distention [No Lymphadenopathy] : no lymphadenopathy [Supple] : supple [Thyroid Normal, No Nodules] : the thyroid was normal and there were no nodules present [No Respiratory Distress] : no respiratory distress  [No Accessory Muscle Use] : no accessory muscle use [Clear to Auscultation] : lungs were clear to auscultation bilaterally [Normal Rate] : normal rate  [Regular Rhythm] : with a regular rhythm [Normal S1, S2] : normal S1 and S2 [No Murmur] : no murmur heard [No Edema] : there was no peripheral edema [No Extremity Clubbing/Cyanosis] : no extremity clubbing/cyanosis [Soft] : abdomen soft [Non Tender] : non-tender [Non-distended] : non-distended [No Masses] : no abdominal mass palpated [No HSM] : no HSM [Normal Bowel Sounds] : normal bowel sounds [Normal Posterior Cervical Nodes] : no posterior cervical lymphadenopathy [Normal Anterior Cervical Nodes] : no anterior cervical lymphadenopathy [No CVA Tenderness] : no CVA  tenderness [No Spinal Tenderness] : no spinal tenderness [No Joint Swelling] : no joint swelling [Grossly Normal Strength/Tone] : grossly normal strength/tone [No Rash] : no rash [Coordination Grossly Intact] : coordination grossly intact [No Focal Deficits] : no focal deficits [Normal Gait] : normal gait [Normal Affect] : the affect was normal [Alert and Oriented x3] : oriented to person, place, and time [Normal Insight/Judgement] : insight and judgment were intact

## 2023-04-05 ENCOUNTER — APPOINTMENT (OUTPATIENT)
Dept: GASTROENTEROLOGY | Facility: CLINIC | Age: 43
End: 2023-04-05
Payer: MEDICAID

## 2023-04-05 PROCEDURE — 99443: CPT

## 2023-04-19 ENCOUNTER — TRANSCRIPTION ENCOUNTER (OUTPATIENT)
Age: 43
End: 2023-04-19

## 2023-04-19 RX ORDER — INSULIN PUMP CARTRIDGE
CARTRIDGE (EA) SUBCUTANEOUS
Qty: 45 | Refills: 3 | Status: DISCONTINUED | COMMUNITY
Start: 2022-01-11 | End: 2023-04-19

## 2023-04-20 ENCOUNTER — TRANSCRIPTION ENCOUNTER (OUTPATIENT)
Age: 43
End: 2023-04-20

## 2023-04-25 ENCOUNTER — TRANSCRIPTION ENCOUNTER (OUTPATIENT)
Age: 43
End: 2023-04-25

## 2023-04-27 ENCOUNTER — APPOINTMENT (OUTPATIENT)
Dept: DERMATOLOGY | Facility: CLINIC | Age: 43
End: 2023-04-27
Payer: MEDICAID

## 2023-04-27 PROCEDURE — D0051: CPT

## 2023-04-27 PROCEDURE — 99214 OFFICE O/P EST MOD 30 MIN: CPT

## 2023-04-27 NOTE — HISTORY OF PRESENT ILLNESS
[FreeTextEntry1] : allyson caal  [de-identified] : LV sept 2021\par was using tretinoin but stopped for dryness\par using Cerave moisturizer on face\par washes face with Cetaphil\par \par needs tac refill for CGM monitor\par \par no hx skin cancer\par

## 2023-04-27 NOTE — PHYSICAL EXAM
[Alert] : alert [Oriented x 3] : ~L oriented x 3 [Well Nourished] : well nourished [Conjunctiva Non-injected] : conjunctiva non-injected [No Visual Lymphadenopathy] : no visual  lymphadenopathy [No Clubbing] : no clubbing [No Edema] : no edema [No Bromhidrosis] : no bromhidrosis [No Chromhidrosis] : no chromhidrosis [Full Body Skin Exam Performed] : performed [FreeTextEntry3] : very light brown skin\par 1 mm white firm papules face\par eczematous geometric plaques on back\par xerosis hands

## 2023-06-02 ENCOUNTER — APPOINTMENT (OUTPATIENT)
Dept: GASTROENTEROLOGY | Facility: CLINIC | Age: 43
End: 2023-06-02
Payer: MEDICAID

## 2023-06-02 PROCEDURE — 99215 OFFICE O/P EST HI 40 MIN: CPT | Mod: 95

## 2023-06-09 ENCOUNTER — TRANSCRIPTION ENCOUNTER (OUTPATIENT)
Age: 43
End: 2023-06-09

## 2023-06-09 RX ORDER — ISOPROPYL ALCOHOL 70 ML/100ML
SWAB TOPICAL
Qty: 4 | Refills: 3 | Status: ACTIVE | COMMUNITY
Start: 2023-06-09 | End: 1900-01-01

## 2023-06-09 RX ORDER — INSULIN LISPRO 100 [IU]/ML
100 INJECTION, SOLUTION INTRAVENOUS; SUBCUTANEOUS
Qty: 3 | Refills: 11 | Status: ACTIVE | COMMUNITY
Start: 2023-06-09 | End: 1900-01-01

## 2023-06-12 ENCOUNTER — APPOINTMENT (OUTPATIENT)
Dept: PULMONOLOGY | Facility: CLINIC | Age: 43
End: 2023-06-12
Payer: MEDICAID

## 2023-06-12 ENCOUNTER — TRANSCRIPTION ENCOUNTER (OUTPATIENT)
Age: 43
End: 2023-06-12

## 2023-06-12 ENCOUNTER — APPOINTMENT (OUTPATIENT)
Dept: ENDOCRINOLOGY | Facility: CLINIC | Age: 43
End: 2023-06-12
Payer: MEDICAID

## 2023-06-12 VITALS
BODY MASS INDEX: 20.61 KG/M2 | WEIGHT: 136 LBS | DIASTOLIC BLOOD PRESSURE: 83 MMHG | TEMPERATURE: 97.5 F | HEART RATE: 73 BPM | OXYGEN SATURATION: 99 % | HEIGHT: 68 IN | SYSTOLIC BLOOD PRESSURE: 127 MMHG

## 2023-06-12 VITALS
WEIGHT: 137 LBS | HEIGHT: 68 IN | HEART RATE: 73 BPM | BODY MASS INDEX: 20.76 KG/M2 | SYSTOLIC BLOOD PRESSURE: 133 MMHG | DIASTOLIC BLOOD PRESSURE: 79 MMHG

## 2023-06-12 LAB
GLUCOSE BLDC GLUCOMTR-MCNC: 111
HBA1C MFR BLD HPLC: 6

## 2023-06-12 PROCEDURE — 82962 GLUCOSE BLOOD TEST: CPT

## 2023-06-12 PROCEDURE — 95251 CONT GLUC MNTR ANALYSIS I&R: CPT

## 2023-06-12 PROCEDURE — 99213 OFFICE O/P EST LOW 20 MIN: CPT | Mod: 25

## 2023-06-12 PROCEDURE — 99203 OFFICE O/P NEW LOW 30 MIN: CPT

## 2023-06-12 PROCEDURE — 83036 HEMOGLOBIN GLYCOSYLATED A1C: CPT | Mod: QW

## 2023-06-12 RX ORDER — INSULIN LISPRO 100 U/ML
100 INJECTION, SOLUTION INTRAVENOUS; SUBCUTANEOUS
Qty: 20 | Refills: 11 | Status: DISCONTINUED | COMMUNITY
Start: 2022-12-06 | End: 2023-06-12

## 2023-06-12 NOTE — ASSESSMENT
[Other____] : [unfilled] [FreeTextEntry1] : DM - CGM report reviewed; Glycemic control is at goal - continue the same Tx;\par info about Lyumjev provided.\par labs today - will send a copy of the report to Dr. Kevin;\par MNG - will repeat US\par Refills sent\par F/U with Dr. Witt in 1 mo.\par \par

## 2023-06-12 NOTE — PHYSICAL EXAM
[Alert] : alert [Well Nourished] : well nourished [Healthy Appearance] : healthy appearance [No Acute Distress] : no acute distress [Normal Sclera/Conjunctiva] : normal sclera/conjunctiva [PERRL] : pupils equal, round and reactive to light [No Lid Lag] : no lid lag [No Respiratory Distress] : no respiratory distress [No Accessory Muscle Use] : no accessory muscle use [Normal Rate and Effort] : normal respiratory rate and effort [Normal Rate] : heart rate was normal [No Edema] : no peripheral edema [Spine Straight] : spine straight [No Stigmata of Cushings Syndrome] : no stigmata of Cushings Syndrome [Normal Gait] : normal gait [No Clubbing, Cyanosis] : no clubbing  or cyanosis of the fingernails [No Involuntary Movements] : no involuntary movements were seen [No Joint Swelling] : no joint swelling seen [No Rash] : no rash [No Skin Lesions] : no skin lesions [No Tremors] : no tremors [Oriented x3] : oriented to person, place, and time [Acanthosis Nigricans] : no acanthosis nigricans

## 2023-06-12 NOTE — HISTORY OF PRESENT ILLNESS
[FreeTextEntry1] : 37 yo female, with type 1 DM since ; is not aware of complications; \par Humalog via [place] pump; Dexcom - is on [place] now; might need to change it to get coverage.\par SMBG - 17 times a day.\par BG is well controlled - \par POC BG - 151 mg/dl (immediately after she had a "green shake")\par POC HbA1C - 5.3%\par patient currently breastfeeding a qo mo old daughter.\par \par Basal rates:\par 12 am - 0.225\par 2:30 am - 0.425\par 5 am - 0.65\par 7 am - 0.4\par 11 am - 0.35\par 1 pm - 0.375\par 10 pm - 0.3\par i/c - 12 am -  ; 6 am - ;  6:30 pm - \par CF -  12 am - 1:60\par          6 am - 1:50\par          3 pm - 1:60\par IOB - 3 h\par has various time-related targets\par \par \par 17 MK\par Pt came for f/u\par Feels well; still breastfeeding.\par uses Dexcom - downloaded.\par \par Ophthalmology - due\par \par \par POC BG - 153 mg/dl\par \par 10/29/18 MK\par Came after a long absence.\par CC - needs new pump and CGM.\par States glycemic control id good.\par Goes through Juicing 5 days a month - hypoglycemia is usually during that time. Pt. sees Dr. Kevin, who monitors her as well. Pt has to meal- bolus herself ahead of time - is on Admelog; hypoglycemia might be related to that as well.\par Interested in learning more and getting Dexcom G-6 and Tandem pump.\par \par POC bg - 119 mg/dl\par POC A1C - 6.0%\par recent labs at PCP - will send them by fax to us.\par \par 19 MK\par Came for f/u.\par Feels that later her BG is higher than before.\par Uses Dexcom - report analyzed in detail.\par Requires adjustment of pump rates. Ready to switch for tandem - interested in Basal IQ technology.\par No other complains.\par \par 19 MK\par Came for f/u on type 1 DM.\par Feels well. Was transitioned to T-slim; waiting for Dexcom G-6 to start using Basal IQ.\par CGM report uploaded, patterns analyzed and discussed with pt.\par No new complains.\par Of note - considers getting a second child, maybe. \par \par 3/10/20 MK\par Started control IQ about a month ago; rates were adjusted by Dr. kevin - pt is very happy with control.\par CGM report uploaded, analyzed and discussed with pt.\par Recent labs from Dr. Ge - A1C - 6.4%; no hypoglycemia.\par Pt is concerned about her thyroid nodule, and f/u with US.\par No other changes or complains.\par \par 6/15/2021 Mk\par Pt needs help adjusting pump rates.\par C/O severe hypoglycemia 3 days ago - she changed the infusion site to a "virgin" one, after unsuccessfully overcorrecting for hyperglycemia;  and her BG dropped to 33 mg/dl, and then "low";\par she did not lose consciousness, and it took her "a long time' to bring it back.\par Her glucagon emergency kit ;\par recent A1C from outside lab was 6.3% (copy sent to us, and will be scanned to chart);\par CGM report downloaded and discussed with pt in detail.\par needs to change I/C.\par gained ~ 10 lb over the last year, as she stopped exercise due to covid.\par Lost her father last December.\par No other changes or complains.\par \par 2021 MK\par Came for f/u on DM management.\par Feels well;\par POC A1C - 6.3% \par CGM and pump reports downloaded, analyzed and discussed with pt.\par She plans to change her insurance and has questions about coverage.\par No other changes or complains.\par \par \par 23 MK\par F/U on type 1 DM, MNG and low vit D.\par Feels fine;\par Lost some weight by intermittent fasting and OMAD.\par Glycemic control reviewed on t:connect dinesh - TIR >90%, No lows;\par As recommended by Dr. Kevin - changes her infusion set qod, and that helps to avoid occlusions.\par POC A1C - 6%.\par MNG - needs to repeat US.\par No other changes or complains.\par \par

## 2023-06-13 ENCOUNTER — TRANSCRIPTION ENCOUNTER (OUTPATIENT)
Age: 43
End: 2023-06-13

## 2023-06-13 LAB
25(OH)D3 SERPL-MCNC: 40.3 NG/ML
ALBUMIN SERPL ELPH-MCNC: 4.6 G/DL
ALP BLD-CCNC: 69 U/L
ALT SERPL-CCNC: 16 U/L
ANION GAP SERPL CALC-SCNC: 12 MMOL/L
AST SERPL-CCNC: 31 U/L
BILIRUB SERPL-MCNC: 0.5 MG/DL
BUN SERPL-MCNC: 7 MG/DL
CALCIUM SERPL-MCNC: 9.7 MG/DL
CHLORIDE SERPL-SCNC: 104 MMOL/L
CHOLEST SERPL-MCNC: 127 MG/DL
CO2 SERPL-SCNC: 25 MMOL/L
CREAT SERPL-MCNC: 0.76 MG/DL
CREAT SPEC-SCNC: 70 MG/DL
EGFR: 98 ML/MIN/1.73M2
ESTIMATED AVERAGE GLUCOSE: 123 MG/DL
GLUCOSE SERPL-MCNC: 113 MG/DL
HBA1C MFR BLD HPLC: 5.9 %
HDLC SERPL-MCNC: 76 MG/DL
LDLC SERPL CALC-MCNC: 42 MG/DL
MICROALBUMIN 24H UR DL<=1MG/L-MCNC: <1.2 MG/DL
MICROALBUMIN/CREAT 24H UR-RTO: NORMAL MG/G
NONHDLC SERPL-MCNC: 51 MG/DL
POTASSIUM SERPL-SCNC: 4.7 MMOL/L
PROT SERPL-MCNC: 7 G/DL
SODIUM SERPL-SCNC: 140 MMOL/L
T3 SERPL-MCNC: 75 NG/DL
T4 FREE SERPL-MCNC: 1.4 NG/DL
TRIGL SERPL-MCNC: 43 MG/DL
TSH SERPL-ACNC: 0.53 UIU/ML

## 2023-06-13 NOTE — PHYSICAL EXAM
[General Appearance - Well Developed] : well developed [General Appearance - Well Nourished] : well nourished [Normal Oropharynx] : normal oropharynx [Heart Sounds] : normal S1 and S2 [Heart Rate And Rhythm] : heart rate was normal and rhythm regular [] : no respiratory distress [Oriented To Time, Place, And Person] : oriented to person, place, and time [Impaired Insight] : insight and judgment were intact [Normal Appearance] : normal appearance [No Deformities] : no deformities [Neck Appearance] : the appearance of the neck was normal [Exaggerated Use Of Accessory Muscles For Inspiration] : no accessory muscle use [Abnormal Walk] : normal gait

## 2023-06-13 NOTE — REVIEW OF SYSTEMS
[EDS: ESS=____] : daytime somnolence: ESS=[unfilled] [Fatigue] : fatigue [Heartburn] : heartburn [Nocturia] : nocturia [Difficulty Maintaining Sleep] : difficulty maintaining sleep [Chronic Insomnia] : chronic  insomnia [Nasal Congestion] : no nasal congestion [Snoring] : no snoring [Postnasal Drip] : no postnasal drip [Witnessed Apneas] : no witnessed apnea [Shortness Of Breath] : no shortness of breath [A.M. Dry Mouth] : no a.m. dry mouth [A.M. Headache] : no headache present upon awakening [Leg Dysesthesias] : no leg dysesthesias [Sleep Paralysis] : no sleep paralysis [Difficulty Initiating Sleep] : no difficulty falling asleep [Lower Extremity Discomfort] : no lower extremity discomfort [Irresistible urge to move legs] : no irresistible urge to move legs because of lower extremity discomfort [Late day/ Evening symptoms] : no late day/evening symptoms [Sleep Disturbances due to LE symptoms] : ~T no sleep disturbances due to lower extremity symptoms [Hypersomnolence] : not sleeping much more than usual [Cataplexy] :  no cataplexy

## 2023-06-13 NOTE — HISTORY OF PRESENT ILLNESS
[FreeTextEntry1] : Patient is a 43 year old female with PMHx of Type I DM, GERD with Chavez's Esophagus referred by PCP Dr. Gonzalez for further evaluation of insomnia. Patient notes insomnia for years, particularly difficulty staying asleep. Has been taking melatonin 10 mg nightly for years, recently told by PCP to decrease dose to 5 mg. Since decreasing to 5mg feels that it hasn't been as effective. Currently goes to bed around 9:30pm, wakes up at 6:00 AM (sleeps with daughter so schedule is often different). Wakes up about 4-5 times nightly to urinate. Often feels she's unable to sleep when she returns to bed. TST ~7hrs. Notes she had snoring during her pregnancy, minimal now. Denies witnessed apneas, AM headaches, dry mouth.  [ESS] : 15

## 2023-06-13 NOTE — ASSESSMENT
[FreeTextEntry1] : This is a 43 year old female with PMHx of GERD, Type I DM referred by PCP Dr. Gonzalez for evaluation of possible sleep apnea. The patient has multiple signs and symptoms of sleep-disordered breathing including insomnia and daytime sleepiness. She was referred to the [facility] for a diagnostic HST. The ramifications of SEBASTIAN and its potential therapeutic modalities were discussed with the patient. She should continue with current Melatonin dose. She will follow up with us after the HST.

## 2023-06-13 NOTE — CONSULT LETTER
[Dear  ___] : Dear  [unfilled], [Courtesy Letter:] : I had the pleasure of seeing your patient, [unfilled], in my office today. [Please see my note below.] : Please see my note below. [Consult Closing:] : Thank you very much for allowing me to participate in the care of this patient.  If you have any questions, please do not hesitate to contact me. [Sincerely,] : Sincerely, [FreeTextEntry3] : Gena Calles MD\par \par [employer] [facility]\par Pulmonary, Critical Care, and Sleep Medicine\par

## 2023-06-14 ENCOUNTER — TRANSCRIPTION ENCOUNTER (OUTPATIENT)
Age: 43
End: 2023-06-14

## 2023-06-26 ENCOUNTER — APPOINTMENT (OUTPATIENT)
Dept: ULTRASOUND IMAGING | Facility: CLINIC | Age: 43
End: 2023-06-26
Payer: MEDICAID

## 2023-06-26 ENCOUNTER — APPOINTMENT (OUTPATIENT)
Dept: MAMMOGRAPHY | Facility: CLINIC | Age: 43
End: 2023-06-26
Payer: MEDICAID

## 2023-06-26 ENCOUNTER — OUTPATIENT (OUTPATIENT)
Dept: OUTPATIENT SERVICES | Facility: HOSPITAL | Age: 43
LOS: 1 days | End: 2023-06-26

## 2023-06-26 ENCOUNTER — RESULT REVIEW (OUTPATIENT)
Age: 43
End: 2023-06-26

## 2023-06-26 PROCEDURE — 76536 US EXAM OF HEAD AND NECK: CPT | Mod: 26

## 2023-06-26 PROCEDURE — 77067 SCR MAMMO BI INCL CAD: CPT | Mod: 26

## 2023-06-26 PROCEDURE — 77063 BREAST TOMOSYNTHESIS BI: CPT | Mod: 26

## 2023-06-26 NOTE — HISTORY OF PRESENT ILLNESS
[de-identified] : 42F with hx DM1 on insulin pump, glutenfree mostly dairyfree, thyroid nodule here to discuss constipation and enema use\par 6/2/23\par doing OMAD but late eating 8pm \par \par \par 10/19/22\par felt great after cscope \par scope with BBPS 9, just internal hemorrhoids\par on mg citrate 500 daily, miralax\par daily probiotics in food\par \par PREVIOUS HX\par uses enemas when she does 5 day monthly juice cleanse for diabetes but for last month has basically been using enemas daily both coffee, saline and feels dependent on it\par stool rarely formed-  more bristol 5\par sometimes mucus strings\par sometimes super bloated after eating\par eats fish\par COFFEE ENEMA DEPENDENCE  came on with fasting- 5 day fast a month- green juice then she would get colonic or enema\par  with diagnosis of h pylori and concerned she may have it\par

## 2023-06-26 NOTE — HISTORY OF PRESENT ILLNESS
[de-identified] : 42F with hx DM1 on insulin pump, glutenfree mostly dairyfree, thyroid nodule here to discuss constipation and enema use\par 6/2/23\par doing OMAD but late eating 8pm \par \par \par 10/19/22\par felt great after cscope \par scope with BBPS 9, just internal hemorrhoids\par on mg citrate 500 daily, miralax\par daily probiotics in food\par \par PREVIOUS HX\par uses enemas when she does 5 day monthly juice cleanse for diabetes but for last month has basically been using enemas daily both coffee, saline and feels dependent on it\par stool rarely formed-  more bristol 5\par sometimes mucus strings\par sometimes super bloated after eating\par eats fish\par COFFEE ENEMA DEPENDENCE  came on with fasting- 5 day fast a month- green juice then she would get colonic or enema\par  with diagnosis of h pylori and concerned she may have it\par

## 2023-06-26 NOTE — ASSESSMENT
[FreeTextEntry1] : 42F with hx DM1 on insulin pump, glutenfree mostly dairyfree, thyroid nodule here to discuss constipation and enema use constipation now OFF ENEMAS and on miralax, magnesium and supportive care presents after ED visit for ACUTE SEVERE LUQ ABD PAIN and BELCHING with partial response to pepcid, mylanta, carafate a/p EGD 2023  showing MOD CHRONIC ESOPHAGITIS AND FOCAL IM- BARRETTS\par - discussed chronic PPI use as barretts chemoprophylaxis\par - EGD in 1 year if no PPI, 2 if PPI\par - stool tests for loose stool \par - continue to optimize bms\par - pelvic floor pt\par - squatty potty\par - mg ox 500mg daily, flax, and other natural bowel movement support discussed\par - colonoscopy in 10 years 2032 (done 2022)\par - cousneled on travelers constipation and titrating miralax\par - f/u in 6wks; -pt understands i am leaving practice in june therefore to f/u with me in new practice or with good colleague and to get f/u appt asap so gets seen in a timely fashion, pt understands so does not get lost to follow up\par

## 2023-06-29 ENCOUNTER — TRANSCRIPTION ENCOUNTER (OUTPATIENT)
Age: 43
End: 2023-06-29

## 2023-07-12 ENCOUNTER — TRANSCRIPTION ENCOUNTER (OUTPATIENT)
Age: 43
End: 2023-07-12

## 2023-07-19 NOTE — HISTORY OF PRESENT ILLNESS
Conjunctivae and eyelids appear normal,  Sclerae : White without injection  [FreeTextEntry1] : annual assessment [de-identified] : 42 y.o. female presents for annual assessment. C/o periorbital puffiness, associated with itching and mild erythema x 2 days, endorses using mupirocin with slight improvement. Otherwise doing well. \par \par DM I\par -Dexacom 6 in place, tolerating well\par - no adverse side effects from current regimen\par \par Thyroid nodule\par - pt reports being followed by Dr. Kevin at [facility] and Dr. Rneo\par - requires US of thyroid nodule\par \par HCM\par - Gyn referral\par - Mammo referral\par - flu vaccine today\par -up to date with opth 11/21\par -routine labs today

## 2023-07-20 ENCOUNTER — RESULT CHARGE (OUTPATIENT)
Age: 43
End: 2023-07-20

## 2023-07-20 ENCOUNTER — APPOINTMENT (OUTPATIENT)
Dept: ENDOCRINOLOGY | Facility: CLINIC | Age: 43
End: 2023-07-20
Payer: MEDICAID

## 2023-07-20 VITALS
HEART RATE: 78 BPM | SYSTOLIC BLOOD PRESSURE: 125 MMHG | BODY MASS INDEX: 21.22 KG/M2 | HEIGHT: 68 IN | DIASTOLIC BLOOD PRESSURE: 76 MMHG | WEIGHT: 140 LBS

## 2023-07-20 LAB — GLUCOSE BLDC GLUCOMTR-MCNC: 170

## 2023-07-20 PROCEDURE — 82962 GLUCOSE BLOOD TEST: CPT

## 2023-07-20 PROCEDURE — 99214 OFFICE O/P EST MOD 30 MIN: CPT | Mod: 25

## 2023-07-20 NOTE — ASSESSMENT
[FreeTextEntry1] : DM, type 1.\par Non-toxic MNG.\par \par Will continue current management.\par Medications refilled.\par F/U - 6 months.

## 2023-07-20 NOTE — HISTORY OF PRESENT ILLNESS
[FreeTextEntry1] : 39 y. o. female, not previously seen by me,  with a h/o DM since 2006.\par Is thought to have type 1 Dm, but C-peptide was still detectable in 5/2017 (0.4).\par Anti-islet cell and anti-TIM AB positive.\par She is using T-Slim insulin pump and CGM.\par BSs are overall in good control but she does experience mild hypoglycemic reactions occasionally, without LOC.\par She is not known to have retinopathy, neuropathy or nephropathy.\par She has a 3.5 y.o. child and is not planning any pregnancies in the immediate future.\par HbA1C today is 6.2%, glucose - 115 mg/dl.\par Her weight has been stable.\par About 1 yr ago she was found to have a thyroid nodule. FNAB was c/w Lohman 4, but molecular studies were c/w low risk of cancer (4%). TFTs have been normal and anti-thyroid AB negative.\par 9/17/2020. The patient is doing well.\par She has gained 8 lb.\par Is now on tandem insulin pump.\par Has occasional mild hypoglycemia.\par HbA1C today is 6.1%, glucose - 172 mg/dl.\par Thyroid u/sound 9/11/2020 unchanged when compared to 12/3/2018.\par 6/20/22. The patient is doing well.\par She has lost 3 lb.\par She reports no hypoglycemia.\par Her last ophthalm. exam was recently, reportedly without any evidence of retinopathy.\par HbA1C today is 5.5%, glucose - 107 mg/dl.\par Last thyroid u/sound 4/28/22 - stable.\par 7/20/23. The patient is doing well - has no complaints.\par She has lost 20 lb.\par HbA1C was 6% on 6/12/23; glucose today is 170 mg/dl.\par She reports no hypoglycemia.\par Thyroid u/sound on 6/26/23 - stable when compared to 2020.

## 2023-07-21 ENCOUNTER — APPOINTMENT (OUTPATIENT)
Dept: GASTROENTEROLOGY | Facility: CLINIC | Age: 43
End: 2023-07-21
Payer: MEDICAID

## 2023-07-21 PROCEDURE — 99214 OFFICE O/P EST MOD 30 MIN: CPT | Mod: 95

## 2023-07-24 NOTE — ASSESSMENT
[FreeTextEntry1] : 44 yo F PMH DM1 on insulin pump, gluten free, mostly dairy free, thyroid nodule here to follow up constipation and GERD. \par \par Constipation: now OFF ENEMAS and on miralax, magnesium and supportive care \par - continue to optimize bms\par - pelvic floor pt\par - squatty potty\par -continue miralax, mg ox 500mg daily, flax, and other natural bowel movement support\par \par GERD\par prior ED visit for ACUTE SEVERE LUQ ABD PAIN and BELCHING with partial response to pepcid, mylanta, carafate a/p EGD 2023  showing MOD CHRONIC ESOPHAGITIS AND FOCAL IM- BARRETTS (has irregular z-line that was biopsied with GE junction with focal intestinal metaplasia\par - discussed chronic PPI use as barretts chemoprophylaxis (patient took a PPI for a short course but subsequently stopped) \par - EGD in 1 year since off of the PPI (plan for July 2024 per prior endoscopist) \par \par Colorectal cancer screening\par - colonoscopy in 10 years 2032 (done 2022)\par \par - Follow up post-procedure (plan for EGD in Jan 2024), follow up afterwards\par

## 2023-07-24 NOTE — HISTORY OF PRESENT ILLNESS
[Home] : at home, [unfilled] , at the time of the visit. [Medical Office: ([facility])___] : at the medical office located in  [Verbal consent obtained from patient] : the patient, [unfilled] [FreeTextEntry1] : 44 yo F PMH DM1 on insulin pump, gluten free, mostly dairy free, thyroid nodule here to follow up constipation and GERD. \par \par 7/21/23\par \par She did see a pelvic floor physical therapist. That was very helpful. \par She has a regular schedule for that. She had to just be on a regular schedule. \par \par She underwent an endoscopy, tried a PPI then weaned off of it. \par She wanted the patient to get endoscopies regularly. \par \par She had a really bad week where she had to go to the emergency room because the acid reflux discomfort was so extreme, she was having trouble breathing. \par Has not happened to her since. She has always had gassiness/burping after eating. \par Sometimes depending on food she is eating the symptoms can change. \par \par She was recommended to pay attention to caffeine intake. \par SHe continues to be glutenfree and dairyfree. Someitmes has gluten but it is rare. She is essentially vegan. Sometimes will have cheese. No eggs and no meat. \par She tries to avoid processed food. \par She was doing the impossible burger thing too often - realizes it is super processed. Her  is also vegan. They are tryign to avoid that a bit because it is processed. \par \par Trying to do tofu instead. \par Not taking anything for acid reflux now. She will get burping after certain meals but never to the point where she feels that severe gas pain. \par \par She never stopped taking miralax. She does a capfull every morning. She takes magnesium oxide at night 500mg. \par \par She was doing OMAD for a while and got her to her weight goal. Now she is introducing two meals a day. \par \par Needs an EGD Jan 2024

## 2023-08-17 ENCOUNTER — APPOINTMENT (OUTPATIENT)
Dept: SLEEP CENTER | Facility: HOME HEALTH | Age: 43
End: 2023-08-17

## 2023-08-17 RX ORDER — INSULIN PUMP CARTRIDGE
CARTRIDGE (EA) SUBCUTANEOUS
Qty: 30 | Refills: 5 | Status: ACTIVE | COMMUNITY
Start: 2023-01-20 | End: 1900-01-01

## 2023-08-30 NOTE — ED ADULT NURSE NOTE - CHIEF COMPLAINT QUOTE
Left message per provider for patient to move appt from 9/13 at 1:00 to 9/19 at 1:00 pt reports cutting Friday night, left hand pointer finger. Tip on finger healing.

## 2023-09-12 ENCOUNTER — APPOINTMENT (OUTPATIENT)
Dept: INTERNAL MEDICINE | Facility: CLINIC | Age: 43
End: 2023-09-12
Payer: MEDICAID

## 2023-09-12 VITALS
WEIGHT: 147 LBS | OXYGEN SATURATION: 99 % | SYSTOLIC BLOOD PRESSURE: 114 MMHG | BODY MASS INDEX: 22.28 KG/M2 | HEIGHT: 68 IN | DIASTOLIC BLOOD PRESSURE: 77 MMHG | HEART RATE: 70 BPM | TEMPERATURE: 97.6 F

## 2023-09-12 PROCEDURE — 90686 IIV4 VACC NO PRSV 0.5 ML IM: CPT

## 2023-09-12 PROCEDURE — G0008: CPT

## 2023-09-12 PROCEDURE — 99214 OFFICE O/P EST MOD 30 MIN: CPT | Mod: 25

## 2023-09-12 RX ORDER — PANTOPRAZOLE 40 MG/1
40 TABLET, DELAYED RELEASE ORAL DAILY
Qty: 30 | Refills: 2 | Status: COMPLETED | COMMUNITY
Start: 2023-02-10 | End: 2023-09-12

## 2023-09-12 RX ORDER — PANTOPRAZOLE 20 MG/1
20 TABLET, DELAYED RELEASE ORAL DAILY
Qty: 90 | Refills: 2 | Status: COMPLETED | COMMUNITY
Start: 2023-04-05 | End: 2023-09-12

## 2023-10-20 ENCOUNTER — TRANSCRIPTION ENCOUNTER (OUTPATIENT)
Age: 43
End: 2023-10-20

## 2023-10-20 ENCOUNTER — RX RENEWAL (OUTPATIENT)
Age: 43
End: 2023-10-20

## 2023-10-20 RX ORDER — BLOOD-GLUCOSE SENSOR
EACH MISCELLANEOUS
Qty: 3 | Refills: 5 | Status: ACTIVE | COMMUNITY
Start: 2022-01-11 | End: 1900-01-01

## 2023-10-20 RX ORDER — BLOOD-GLUCOSE TRANSMITTER
EACH MISCELLANEOUS
Qty: 1 | Refills: 3 | Status: ACTIVE | COMMUNITY
Start: 2022-01-11 | End: 1900-01-01

## 2023-10-25 LAB
25(OH)D3 SERPL-MCNC: 26 NG/ML
ALBUMIN SERPL ELPH-MCNC: 4.5 G/DL
ALP BLD-CCNC: 76 U/L
ALT SERPL-CCNC: 17 U/L
ANION GAP SERPL CALC-SCNC: 12 MMOL/L
APPEARANCE: CLEAR
AST SERPL-CCNC: 28 U/L
BACTERIA: NEGATIVE /HPF
BASOPHILS # BLD AUTO: 0.04 K/UL
BASOPHILS NFR BLD AUTO: 0.7 %
BILIRUB SERPL-MCNC: 0.7 MG/DL
BILIRUBIN URINE: NEGATIVE
BLOOD URINE: NEGATIVE
BUN SERPL-MCNC: 9 MG/DL
CALCIUM SERPL-MCNC: 9.4 MG/DL
CAST: 0 /LPF
CHLORIDE SERPL-SCNC: 103 MMOL/L
CHOLEST SERPL-MCNC: 160 MG/DL
CO2 SERPL-SCNC: 23 MMOL/L
COLOR: YELLOW
CREAT SERPL-MCNC: 0.84 MG/DL
CREAT SPEC-SCNC: 35 MG/DL
EGFR: 87 ML/MIN/1.73M2
EOSINOPHIL # BLD AUTO: 0.01 K/UL
EOSINOPHIL NFR BLD AUTO: 0.2 %
EPITHELIAL CELLS: 3 /HPF
ESTIMATED AVERAGE GLUCOSE: 140 MG/DL
GLUCOSE QUALITATIVE U: NEGATIVE MG/DL
GLUCOSE SERPL-MCNC: 110 MG/DL
HBA1C MFR BLD HPLC: 6.5 %
HCT VFR BLD CALC: 40.3 %
HDLC SERPL-MCNC: 105 MG/DL
HGB BLD-MCNC: 13.3 G/DL
IMM GRANULOCYTES NFR BLD AUTO: 0.4 %
KETONES URINE: 40 MG/DL
LDLC SERPL CALC-MCNC: 43 MG/DL
LEUKOCYTE ESTERASE URINE: ABNORMAL
LYMPHOCYTES # BLD AUTO: 1.78 K/UL
LYMPHOCYTES NFR BLD AUTO: 32.7 %
MAN DIFF?: NORMAL
MCHC RBC-ENTMCNC: 32.2 PG
MCHC RBC-ENTMCNC: 33 GM/DL
MCV RBC AUTO: 97.6 FL
MICROALBUMIN 24H UR DL<=1MG/L-MCNC: <1.2 MG/DL
MICROALBUMIN/CREAT 24H UR-RTO: NORMAL MG/G
MICROSCOPIC-UA: NORMAL
MONOCYTES # BLD AUTO: 0.42 K/UL
MONOCYTES NFR BLD AUTO: 7.7 %
NEUTROPHILS # BLD AUTO: 3.17 K/UL
NEUTROPHILS NFR BLD AUTO: 58.3 %
NITRITE URINE: NEGATIVE
NONHDLC SERPL-MCNC: 55 MG/DL
PH URINE: 6
PLATELET # BLD AUTO: 226 K/UL
POTASSIUM SERPL-SCNC: 4.9 MMOL/L
PROT SERPL-MCNC: 6.9 G/DL
PROTEIN URINE: NEGATIVE MG/DL
RBC # BLD: 4.13 M/UL
RBC # FLD: 11.9 %
RED BLOOD CELLS URINE: 0 /HPF
REVIEW: NORMAL
SODIUM SERPL-SCNC: 138 MMOL/L
SPECIFIC GRAVITY URINE: 1.01
T3 SERPL-MCNC: 78 NG/DL
T4 FREE SERPL-MCNC: 1.3 NG/DL
TRIGL SERPL-MCNC: 62 MG/DL
TSH SERPL-ACNC: 0.51 UIU/ML
UROBILINOGEN URINE: 0.2 MG/DL
WBC # FLD AUTO: 5.44 K/UL
WHITE BLOOD CELLS URINE: 1 /HPF

## 2023-12-28 ENCOUNTER — RX RENEWAL (OUTPATIENT)
Age: 43
End: 2023-12-28

## 2023-12-28 ENCOUNTER — TRANSCRIPTION ENCOUNTER (OUTPATIENT)
Age: 43
End: 2023-12-28

## 2023-12-28 RX ORDER — INSULIN LISPRO 100 [IU]/ML
100 INJECTION, SOLUTION INTRAVENOUS; SUBCUTANEOUS
Qty: 30 | Refills: 4 | Status: ACTIVE | COMMUNITY
Start: 2023-12-28 | End: 1900-01-01

## 2023-12-29 ENCOUNTER — TRANSCRIPTION ENCOUNTER (OUTPATIENT)
Age: 43
End: 2023-12-29

## 2024-01-29 ENCOUNTER — APPOINTMENT (OUTPATIENT)
Dept: OPHTHALMOLOGY | Facility: CLINIC | Age: 44
End: 2024-01-29

## 2024-02-02 ENCOUNTER — NON-APPOINTMENT (OUTPATIENT)
Age: 44
End: 2024-02-02

## 2024-02-02 RX ORDER — RESVERA/CHROM/GR.TEA/EGCG/DIG3 50MG-20MCG
5 CAPSULE ORAL AT BEDTIME
Refills: 0 | Status: COMPLETED | COMMUNITY
Start: 2023-03-06 | End: 2024-02-02

## 2024-02-15 ENCOUNTER — APPOINTMENT (OUTPATIENT)
Age: 44
End: 2024-02-15
Payer: MEDICAID

## 2024-02-15 ENCOUNTER — RESULT REVIEW (OUTPATIENT)
Age: 44
End: 2024-02-15

## 2024-02-15 PROCEDURE — 43239 EGD BIOPSY SINGLE/MULTIPLE: CPT

## 2024-02-16 ENCOUNTER — TRANSCRIPTION ENCOUNTER (OUTPATIENT)
Age: 44
End: 2024-02-16

## 2024-02-16 ENCOUNTER — NON-APPOINTMENT (OUTPATIENT)
Age: 44
End: 2024-02-16

## 2024-02-17 ENCOUNTER — TRANSCRIPTION ENCOUNTER (OUTPATIENT)
Age: 44
End: 2024-02-17

## 2024-02-23 ENCOUNTER — TRANSCRIPTION ENCOUNTER (OUTPATIENT)
Age: 44
End: 2024-02-23

## 2024-02-25 ENCOUNTER — TRANSCRIPTION ENCOUNTER (OUTPATIENT)
Age: 44
End: 2024-02-25

## 2024-03-01 ENCOUNTER — APPOINTMENT (OUTPATIENT)
Dept: GASTROENTEROLOGY | Facility: CLINIC | Age: 44
End: 2024-03-01
Payer: MEDICAID

## 2024-03-01 PROCEDURE — 99213 OFFICE O/P EST LOW 20 MIN: CPT

## 2024-03-04 ENCOUNTER — APPOINTMENT (OUTPATIENT)
Dept: OPHTHALMOLOGY | Facility: CLINIC | Age: 44
End: 2024-03-04
Payer: MEDICAID

## 2024-03-04 ENCOUNTER — NON-APPOINTMENT (OUTPATIENT)
Age: 44
End: 2024-03-04

## 2024-03-04 ENCOUNTER — TRANSCRIPTION ENCOUNTER (OUTPATIENT)
Age: 44
End: 2024-03-04

## 2024-03-04 PROCEDURE — 92014 COMPRE OPH EXAM EST PT 1/>: CPT | Mod: 25

## 2024-03-04 PROCEDURE — 92201 OPSCPY EXTND RTA DRAW UNI/BI: CPT

## 2024-03-06 ENCOUNTER — TRANSCRIPTION ENCOUNTER (OUTPATIENT)
Age: 44
End: 2024-03-06

## 2024-03-08 PROBLEM — K22.10 EROSIVE ESOPHAGITIS: Status: ACTIVE | Noted: 2023-02-10

## 2024-03-11 ENCOUNTER — APPOINTMENT (OUTPATIENT)
Dept: INTERNAL MEDICINE | Facility: CLINIC | Age: 44
End: 2024-03-11

## 2024-03-14 ENCOUNTER — NON-APPOINTMENT (OUTPATIENT)
Age: 44
End: 2024-03-14

## 2024-03-14 ENCOUNTER — APPOINTMENT (OUTPATIENT)
Dept: THORACIC SURGERY | Facility: CLINIC | Age: 44
End: 2024-03-14
Payer: MEDICAID

## 2024-03-14 VITALS
DIASTOLIC BLOOD PRESSURE: 82 MMHG | OXYGEN SATURATION: 99 % | HEART RATE: 72 BPM | RESPIRATION RATE: 17 BRPM | BODY MASS INDEX: 20.92 KG/M2 | SYSTOLIC BLOOD PRESSURE: 116 MMHG | TEMPERATURE: 96.6 F | WEIGHT: 138 LBS | HEIGHT: 68 IN

## 2024-03-14 DIAGNOSIS — Z82.49 FAMILY HISTORY OF ISCHEMIC HEART DISEASE AND OTHER DISEASES OF THE CIRCULATORY SYSTEM: ICD-10-CM

## 2024-03-14 DIAGNOSIS — K22.10 ULCER OF ESOPHAGUS W/OUT BLEEDING: ICD-10-CM

## 2024-03-14 DIAGNOSIS — Z83.3 FAMILY HISTORY OF DIABETES MELLITUS: ICD-10-CM

## 2024-03-14 PROCEDURE — 99203 OFFICE O/P NEW LOW 30 MIN: CPT

## 2024-03-14 RX ORDER — FAMOTIDINE 40 MG/1
40 TABLET, FILM COATED ORAL
Qty: 30 | Refills: 1 | Status: DISCONTINUED | COMMUNITY
Start: 2023-09-12 | End: 2024-03-14

## 2024-03-14 RX ORDER — PNEUMOCOCCAL VACCINE POLYVALENT 25; 25; 25; 25; 25; 25; 25; 25; 25; 25; 25; 25; 25; 25; 25; 25; 25; 25; 25; 25; 25; 25; 25 UG/.5ML; UG/.5ML; UG/.5ML; UG/.5ML; UG/.5ML; UG/.5ML; UG/.5ML; UG/.5ML; UG/.5ML; UG/.5ML; UG/.5ML; UG/.5ML; UG/.5ML; UG/.5ML; UG/.5ML; UG/.5ML; UG/.5ML; UG/.5ML; UG/.5ML; UG/.5ML; UG/.5ML; UG/.5ML; UG/.5ML
25 INJECTION, SOLUTION INTRAMUSCULAR; SUBCUTANEOUS
Qty: 1 | Refills: 0 | Status: DISCONTINUED | COMMUNITY
Start: 2023-09-12 | End: 2024-03-14

## 2024-03-14 NOTE — PHYSICAL EXAM
[Fully active, able to carry on all pre-disease performance without restriction] : Status 0 - Fully active, able to carry on all pre-disease performance without restriction [General Appearance - Alert] : alert [Sclera] : the sclera and conjunctiva were normal [General Appearance - In No Acute Distress] : in no acute distress [Extraocular Movements] : extraocular movements were intact [PERRL With Normal Accommodation] : pupils were equal in size, round, and reactive to light [Outer Ear] : the ears and nose were normal in appearance [Oropharynx] : the oropharynx was normal [Neck Appearance] : the appearance of the neck was normal [Neck Cervical Mass (___cm)] : no neck mass was observed [Jugular Venous Distention Increased] : there was no jugular-venous distention [Thyroid Diffuse Enlargement] : the thyroid was not enlarged [Auscultation Breath Sounds / Voice Sounds] : lungs were clear to auscultation bilaterally [Heart Sounds Gallop] : no gallops [Heart Rate And Rhythm] : heart rate was normal and rhythm regular [Heart Sounds] : normal S1 and S2 [Heart Sounds Pericardial Friction Rub] : no pericardial rub [Murmurs] : no murmurs [Chest Visual Inspection Thoracic Asymmetry] : no chest asymmetry [Examination Of The Chest] : the chest was normal in appearance [Bowel Sounds] : normal bowel sounds [Diminished Respiratory Excursion] : normal chest expansion [Abdomen Tenderness] : non-tender [Abdomen Soft] : soft [Abdomen Mass (___ Cm)] : no abdominal mass palpated [Cervical Lymph Nodes Enlarged Posterior Bilaterally] : posterior cervical [Supraclavicular Lymph Nodes Enlarged Bilaterally] : supraclavicular [Cervical Lymph Nodes Enlarged Anterior Bilaterally] : anterior cervical [Axillary Lymph Nodes Enlarged Bilaterally] : axillary [No CVA Tenderness] : no ~M costovertebral angle tenderness [No Spinal Tenderness] : no spinal tenderness [Abnormal Walk] : normal gait [Musculoskeletal - Swelling] : no joint swelling seen [Nail Clubbing] : no clubbing  or cyanosis of the fingernails [Skin Color & Pigmentation] : normal skin color and pigmentation [Motor Tone] : muscle strength and tone were normal [] : no rash [Skin Turgor] : normal skin turgor [Deep Tendon Reflexes (DTR)] : deep tendon reflexes were 2+ and symmetric [No Focal Deficits] : no focal deficits [Sensation] : the sensory exam was normal to light touch and pinprick [Oriented To Time, Place, And Person] : oriented to person, place, and time [Impaired Insight] : insight and judgment were intact [Affect] : the affect was normal

## 2024-03-14 NOTE — ASSESSMENT
[FreeTextEntry1] : 43 y/o F, never smoker, w/ a PMHx of chronic cough, abdominal pain with bloating, chronic constipation, Type I DM on an insulin pump, elevated liver enzymes, and thyroid nodules. She has no significant PSHx and her family hx  is negative for CAD or neoplasm.   She is referred to our practice by her gastroenterologist Dr. Zoya Mckinley for an evaluation of a hiatal hernia. Prior workup are as follows:  Endoscopy performed on 2/15/24: A hiatal hernia was seen, the EGJ was noted at 40cm with narrowing at 42cm from the incisors.  Retroflexion view in the stomach confirmed the size and morphology- Hill Grade II  Irregularity in the area at and just proximal to the squamo-columnar junction (biopsied) Erythema and ulceration in the  stomach body (biopsied) Ulceration and erythema in the second part of the duodenum (biopsied)  Pathology from Endoscopy on 2/15/24: Duodenum; biopsy:  Focal active duodenitis Villous architecture away from the focus of inflammation is preserved Negative for pathogenic organisms  Stomach; biopsy:  Gastric mucosa without significant histologic abnormality Negative for Helicobacter pylori on immunohistochemical stain.   Lower esophagus; biopsy:  Squamo-columnar mucosa showing active chronic inflammation of cardia mucosa No specialized columnar mucosa of Chavez's type is identified   She has made dietary modifications- gluten free, mostly dairy free, and avoids processed foods.  She reports that her main complaint is bloating after eating.  Her endoscopy findings were reviewed which shows inflammation from reflux as well as her hiatal hernia. She reports mainly symptoms of acid reflux worse in the morning with a hoarse voice as well as symptoms of bloating which bothers her more.  The risks and benefits of EGD, Robotic Assisted Diaphragmatic Hernia Repair with mesh, fundoplication, possible open were discussed with the patient including but not limited to risks of infection, bleeding, pneumonias, thromboembolic complications, arrhythmias, myocardial infarction, as well as the risks of anesthesia. Specific risks discussed included postoperative dysphagia requiring intervention, increased bloating, as well as recurrence of hernia. A siddharth discussion of the procedure was performed, and all questions were answered.    She wishes to undergo more testing for her hernia and will need an UGI, Manometry, a chest CT, as well as pH study although it appears she has significant reflux.   Plan: 1. UGI 2. Manometry 3. Chest CT 4. pH study  I, Dr. Lisy Restrepo MD, personally performed the evaluation and management (E/M) services for this new patient.  That E/M includes conducting the clinically appropriate initial history &/or exam, assessing all conditions, and establishing the plan of care.  I have also independently reviewed the medical records and imaging at the time of this office visit, and discussed the above mentioned images with interpretations with the patient. Today, my YVONNE was here to observe &/or participate in the visit & follow plan of care established by me.

## 2024-03-14 NOTE — SOCIAL HISTORY
[TextEntry] : Occupation: [employer], teaches   Patient denies any major mental health history

## 2024-03-14 NOTE — HISTORY OF PRESENT ILLNESS
[FreeTextEntry1] : Ms. Mortensen is a 45 y/o F, never smoker, w/ a PMHx of chronic cough, abdominal pain with bloating, chronic constipation, Type I DM on an insulin pump, elevated liver enzymes, and thyroid nodules. She has no significant PSHx and her family hx  is negative for CAD or neoplasm.   She is referred to our practice by her gastroenterologist Dr. Zoya Mckinley for an evaluation of a hiatal hernia. Prior workup are as follows:  Endoscopy performed on 2/15/24: A hiatal hernia was seen, the EGJ was noted at 40cm with narrowing at 42cm from the incisors.  Retroflexion view in the stomach confirmed the size and morphology- Hill Grade II  Irregularity in the area at and just proximal to the squamo-columnar junction (biopsied) Erythema and ulceration in the  stomach body (biopsied) Ulceration and erythema in the second part of the duodenum (biopsied)  Pathology from Endoscopy on 2/15/24: Duodenum; biopsy:  Focal active duodenitis Villous architecture away from the focus of inflammation is preserved Negative for pathogenic organisms  Stomach; biopsy:  Gastric mucosa without significant histologic abnormality Negative for Helicobacter pylori on immunohistochemical stain.   Lower esophagus; biopsy:  Squamo-columnar mucosa showing active chronic inflammation of cardia mucosa No specialized columnar mucosa of Chavez's type is identified   She has made dietary modifications- gluten free, mostly dairy free, and avoids processed foods, she is vegan.    She reports that her main complaint is bloating and gas after eating. Currently, she is taking Omeprazole 40mg for her symptoms along with multiple TUMS per day. She does still experience acid reflux on medication.  She denies food sticking or vomiting.  She is planned to have an upper endoscopy on 4/18/24 to reassess the erosions that were noted on her February study.     
Yes

## 2024-03-14 NOTE — CONSULT LETTER
[Dear  ___] : Dear  [unfilled], [Please see my note below.] : Please see my note below. [Consult Letter:] : I had the pleasure of evaluating your patient, [unfilled]. [Consult Closing:] : Thank you very much for allowing me to participate in the care of this patient.  If you have any questions, please do not hesitate to contact me. [Sincerely,] : Sincerely, [FreeTextEntry2] : DR. JONNIE WORRELL [FreeTextEntry3] : Lisy Restrepo MD   Chief, Thoracic Surgery  Department of Cardiovascular and Thoracic Surgery  Professor of Cardiovascular & Thoracic Surgery  [employer] at [address] - [address] [place] [address]  Ph: [phone]  Fax: [phone]

## 2024-03-18 ENCOUNTER — APPOINTMENT (OUTPATIENT)
Dept: FAMILY MEDICINE | Facility: CLINIC | Age: 44
End: 2024-03-18

## 2024-03-22 ENCOUNTER — APPOINTMENT (OUTPATIENT)
Dept: GASTROENTEROLOGY | Facility: CLINIC | Age: 44
End: 2024-03-22
Payer: MEDICAID

## 2024-03-22 PROCEDURE — 99442: CPT

## 2024-04-10 ENCOUNTER — TRANSCRIPTION ENCOUNTER (OUTPATIENT)
Age: 44
End: 2024-04-10

## 2024-04-12 ENCOUNTER — TRANSCRIPTION ENCOUNTER (OUTPATIENT)
Age: 44
End: 2024-04-12

## 2024-04-18 ENCOUNTER — APPOINTMENT (OUTPATIENT)
Age: 44
End: 2024-04-18
Payer: MEDICAID

## 2024-04-18 ENCOUNTER — RESULT REVIEW (OUTPATIENT)
Age: 44
End: 2024-04-18

## 2024-04-18 PROCEDURE — 43239 EGD BIOPSY SINGLE/MULTIPLE: CPT

## 2024-04-22 ENCOUNTER — APPOINTMENT (OUTPATIENT)
Dept: ENDOCRINOLOGY | Facility: CLINIC | Age: 44
End: 2024-04-22
Payer: MEDICAID

## 2024-04-22 PROCEDURE — 99213 OFFICE O/P EST LOW 20 MIN: CPT

## 2024-04-22 PROCEDURE — G2211 COMPLEX E/M VISIT ADD ON: CPT | Mod: NC,1L,95

## 2024-04-22 RX ORDER — BLOOD-GLUCOSE SENSOR
EACH MISCELLANEOUS
Qty: 3 | Refills: 11 | Status: ACTIVE | COMMUNITY
Start: 2024-04-22 | End: 1900-01-01

## 2024-04-22 RX ORDER — BLOOD-GLUCOSE,RECEIVER,CONT
EACH MISCELLANEOUS
Qty: 1 | Refills: 0 | Status: ACTIVE | COMMUNITY
Start: 2024-04-22 | End: 1900-01-01

## 2024-04-22 NOTE — HISTORY OF PRESENT ILLNESS
[FreeTextEntry1] : 35 yo female, with type 1 DM since ; is not aware of complications;  Humalog via [place] pump; Dexcom - is on Saji now; might need to change it to get coverage. SMBG - 17 times a day. BG is well controlled -  POC BG - 151 mg/dl (immediately after she had a "green shake") POC HbA1C - 5.3% patient currently breastfeeding a qo mo old daughter.  Basal rates: 12 am - 0.225 2:30 am - 0.425 5 am - 0.65 7 am - 0.4 11 am - 0.35 1 pm - 0.375 10 pm - 0.3 i/c - 12 am -  ; 6 am - ;  6:30 pm -  CF -  12 am - 1:60          6 am - 1:50          3 pm - 1:60 IOB - 3 h has various time-related targets   17 MK Pt came for f/u Feels well; still breastfeeding. uses Dexcom - downloaded.  Ophthalmology - due   POC BG - 153 mg/dl  10/29/18 MK Came after a long absence. CC - needs new pump and CGM. States glycemic control id good. Goes through Juicing 5 days a month - hypoglycemia is usually during that time. Pt. sees Dr. Kevin, who monitors her as well. Pt has to meal- bolus herself ahead of time - is on Admelog; hypoglycemia might be related to that as well. Interested in learning more and getting Dexcom G-6 and Tandem pump.  POC bg - 119 mg/dl POC A1C - 6.0% recent labs at PCP - will send them by fax to us.  19 MK Came for f/u. Feels that later her BG is higher than before. Uses Dexcom - report analyzed in detail. Requires adjustment of pump rates. Ready to switch for tandem - interested in Basal IQ technology. No other complains.  19 MK Came for f/u on type 1 DM. Feels well. Was transitioned to T-slim; waiting for Dexcom G-6 to start using Basal IQ. CGM report uploaded, patterns analyzed and discussed with pt. No new complains. Of note - considers getting a second child, maybe.   3/10/20 MK Started control IQ about a month ago; rates were adjusted by Dr. kevin - pt is very happy with control. CGM report uploaded, analyzed and discussed with pt. Recent labs from Dr. Ge - A1C - 6.4%; no hypoglycemia. Pt is concerned about her thyroid nodule, and f/u with US. No other changes or complains.  6/15/2021  Pt needs help adjusting pump rates. C/O severe hypoglycemia 3 days ago - she changed the infusion site to a "virgin" one, after unsuccessfully overcorrecting for hyperglycemia;  and her BG dropped to 33 mg/dl, and then "low"; she did not lose consciousness, and it took her "a long time' to bring it back. Her glucagon emergency kit ; recent A1C from outside lab was 6.3% (copy sent to us, and will be scanned to chart); CGM report downloaded and discussed with pt in detail. needs to change I/C. gained ~ 10 lb over the last year, as she stopped exercise due to covid. Lost her father last December. No other changes or complains.  2021  Came for f/u on DM management. Feels well; POC A1C - 6.3%  CGM and pump reports downloaded, analyzed and discussed with pt. She plans to change her insurance and has questions about coverage. No other changes or complains.   23  F/U on type 1 DM, MNG and low vit D. Feels fine; Lost some weight by intermittent fasting and OMAD. Glycemic control reviewed on t:connect dinesh - TIR >90%, No lows; As recommended by Dr. Kevin - changes her infusion set qod, and that helps to avoid occlusions. POC A1C - 6%. MNG - needs to repeat US. No other changes or complains.  24   This visit was provided via telehealth using real-time 2-way audio visual technology. The patient was located at home at the time of the visit. The provider, ARTEM VOSS, was located at the medical office located in [place], [place] at the time of the visit. The patient and Provider participated in the telehealth encounter. Verbal consent given by the patient. Feels well. Interested in switching to G-7. has questions. I was not able to download any reports. Pt states recent A1C was 6.4% and CGM shows TIR - 88%. Saw Dr. Kevin recently - states everything was stable - does not need an adjustment of pump rates. Last labs at [facility] in January. Ophtho - UTD Podiatry - UTD

## 2024-04-22 NOTE — ASSESSMENT
[Long Term Vascular Complications] : long term vascular complications of diabetes [Retinopathy Screening] : Patient was referred to ophthalmology for retinopathy screening [FreeTextEntry1] : Diabetes - control at goal, based on pt';s report. Labs today Upgrade to Control IQ with Dexcom G-7 discussed; difference explained; instructed on further steps. Rx sent. Will call me if has a problem. F/U with Dr. Witt in 3-4 months.

## 2024-04-22 NOTE — REVIEW OF SYSTEMS
FYI only:    Was able to speak with pt. Appt scheduled for 1pm with Dr. Campos. Pt agreed with plan.    [All other systems negative] : All other systems negative

## 2024-04-24 ENCOUNTER — TRANSCRIPTION ENCOUNTER (OUTPATIENT)
Age: 44
End: 2024-04-24

## 2024-04-24 LAB
25(OH)D3 SERPL-MCNC: 36.8 NG/ML
ALBUMIN SERPL ELPH-MCNC: 4.4 G/DL
ALP BLD-CCNC: 74 U/L
ALT SERPL-CCNC: 14 U/L
ANION GAP SERPL CALC-SCNC: 11 MMOL/L
AST SERPL-CCNC: 25 U/L
BASOPHILS # BLD AUTO: 0.03 K/UL
BASOPHILS NFR BLD AUTO: 0.4 %
BILIRUB SERPL-MCNC: 0.5 MG/DL
BUN SERPL-MCNC: 11 MG/DL
CALCIUM SERPL-MCNC: 9.6 MG/DL
CHLORIDE SERPL-SCNC: 107 MMOL/L
CHOLEST SERPL-MCNC: 163 MG/DL
CO2 SERPL-SCNC: 24 MMOL/L
CREAT SERPL-MCNC: 0.83 MG/DL
EGFR: 88 ML/MIN/1.73M2
EOSINOPHIL # BLD AUTO: 0.02 K/UL
EOSINOPHIL NFR BLD AUTO: 0.3 %
ESTIMATED AVERAGE GLUCOSE: 140 MG/DL
GLUCOSE SERPL-MCNC: 130 MG/DL
HBA1C MFR BLD HPLC: 6.5 %
HCT VFR BLD CALC: 39.9 %
HDLC SERPL-MCNC: 91 MG/DL
HGB BLD-MCNC: 13.4 G/DL
IMM GRANULOCYTES NFR BLD AUTO: 0.3 %
LDLC SERPL CALC-MCNC: 61 MG/DL
LYMPHOCYTES # BLD AUTO: 1.58 K/UL
LYMPHOCYTES NFR BLD AUTO: 22.9 %
MAN DIFF?: NORMAL
MCHC RBC-ENTMCNC: 32.3 PG
MCHC RBC-ENTMCNC: 33.6 GM/DL
MCV RBC AUTO: 96.1 FL
MONOCYTES # BLD AUTO: 0.46 K/UL
MONOCYTES NFR BLD AUTO: 6.7 %
NEUTROPHILS # BLD AUTO: 4.79 K/UL
NEUTROPHILS NFR BLD AUTO: 69.4 %
NONHDLC SERPL-MCNC: 72 MG/DL
PLATELET # BLD AUTO: 222 K/UL
POTASSIUM SERPL-SCNC: 4.3 MMOL/L
PROT SERPL-MCNC: 6.8 G/DL
RBC # BLD: 4.15 M/UL
RBC # FLD: 12.2 %
SODIUM SERPL-SCNC: 142 MMOL/L
T3 SERPL-MCNC: 77 NG/DL
T4 FREE SERPL-MCNC: 1.1 NG/DL
TRIGL SERPL-MCNC: 52 MG/DL
TSH SERPL-ACNC: 1.29 UIU/ML
WBC # FLD AUTO: 6.9 K/UL

## 2024-04-25 ENCOUNTER — RESULT REVIEW (OUTPATIENT)
Age: 44
End: 2024-04-25

## 2024-04-25 ENCOUNTER — OUTPATIENT (OUTPATIENT)
Dept: OUTPATIENT SERVICES | Facility: HOSPITAL | Age: 44
LOS: 1 days | End: 2024-04-25
Payer: MEDICAID

## 2024-04-25 ENCOUNTER — APPOINTMENT (OUTPATIENT)
Dept: RADIOLOGY | Facility: HOSPITAL | Age: 44
End: 2024-04-25

## 2024-04-25 ENCOUNTER — APPOINTMENT (OUTPATIENT)
Dept: CT IMAGING | Facility: HOSPITAL | Age: 44
End: 2024-04-25

## 2024-04-25 ENCOUNTER — TRANSCRIPTION ENCOUNTER (OUTPATIENT)
Age: 44
End: 2024-04-25

## 2024-04-25 PROCEDURE — 74240 X-RAY XM UPR GI TRC 1CNTRST: CPT

## 2024-04-25 PROCEDURE — 71250 CT THORAX DX C-: CPT

## 2024-04-25 PROCEDURE — 71250 CT THORAX DX C-: CPT | Mod: 26

## 2024-04-25 PROCEDURE — 74240 X-RAY XM UPR GI TRC 1CNTRST: CPT | Mod: 26

## 2024-04-25 NOTE — HISTORY OF PRESENT ILLNESS
[Home] : at home, [unfilled] , at the time of the visit. [Medical Office: ([facility])___] : at the medical office located in  [Verbal consent obtained from patient] : the patient, [unfilled] [FreeTextEntry1] : 42 yo F PMH DM1 on insulin pump, gluten free, mostly dairy free, thyroid nodule here to follow up constipation and GERD.     42 yo F PMH DM1 on insulin pump, gluten free, mostly dairy free, thyroid nodule here to follow up constipation and GERD.    7/21/23    She did see a pelvic floor physical therapist. That was very helpful.  She has a regular schedule for that. She had to just be on a regular schedule.    She underwent an endoscopy, tried a PPI then weaned off of it.  She wanted the patient to get endoscopies regularly.    She had a really bad week where she had to go to the emergency room because the acid reflux discomfort was so extreme, she was having trouble breathing.  Has not happened to her since. She has always had gassiness/burping after eating.  Sometimes depending on food she is eating the symptoms can change.    She was recommended to pay attention to caffeine intake.  SHe continues to be glutenfree and dairyfree. Someitmes has gluten but it is rare. She is essentially vegan. Sometimes will have cheese. No eggs and no meat.  She tries to avoid processed food.  She was doing the impossible burger thing too often - realizes it is super processed. Her  is also vegan. They are tryign to avoid that a bit because it is processed.    Trying to do tofu instead.  Not taking anything for acid reflux now. She will get burping after certain meals but never to the point where she feels that severe gas pain.    She never stopped taking miralax. She does a capfull every morning. She takes magnesium oxide at night 500mg.    She was doing OMAD for a while and got her to her weight goal. Now she is introducing two meals a day.    Needs an EGD Jan 2024

## 2024-05-02 ENCOUNTER — APPOINTMENT (OUTPATIENT)
Dept: GASTROENTEROLOGY | Facility: HOSPITAL | Age: 44
End: 2024-05-02

## 2024-05-02 ENCOUNTER — OUTPATIENT (OUTPATIENT)
Dept: OUTPATIENT SERVICES | Facility: HOSPITAL | Age: 44
LOS: 1 days | Discharge: HOME | End: 2024-05-02
Payer: COMMERCIAL

## 2024-05-02 PROCEDURE — C1889: CPT

## 2024-05-02 PROCEDURE — 91010 ESOPHAGUS MOTILITY STUDY: CPT | Mod: 26

## 2024-05-02 PROCEDURE — 91010 ESOPHAGUS MOTILITY STUDY: CPT

## 2024-05-02 PROCEDURE — 91037 ESOPH IMPED FUNCTION TEST: CPT | Mod: 26,59

## 2024-05-02 DEVICE — CATH VERSAFLEX Z PH: Type: IMPLANTABLE DEVICE | Status: FUNCTIONAL

## 2024-05-06 ENCOUNTER — NON-APPOINTMENT (OUTPATIENT)
Age: 44
End: 2024-05-06

## 2024-05-07 ENCOUNTER — TRANSCRIPTION ENCOUNTER (OUTPATIENT)
Age: 44
End: 2024-05-07

## 2024-05-08 ENCOUNTER — TRANSCRIPTION ENCOUNTER (OUTPATIENT)
Age: 44
End: 2024-05-08

## 2024-05-09 ENCOUNTER — RESULT REVIEW (OUTPATIENT)
Age: 44
End: 2024-05-09

## 2024-05-16 ENCOUNTER — APPOINTMENT (OUTPATIENT)
Dept: THORACIC SURGERY | Facility: CLINIC | Age: 44
End: 2024-05-16
Payer: MEDICAID

## 2024-05-23 ENCOUNTER — APPOINTMENT (OUTPATIENT)
Dept: NUCLEAR MEDICINE | Facility: HOSPITAL | Age: 44
End: 2024-05-23
Payer: MEDICAID

## 2024-05-23 ENCOUNTER — TRANSCRIPTION ENCOUNTER (OUTPATIENT)
Age: 44
End: 2024-05-23

## 2024-05-23 ENCOUNTER — OUTPATIENT (OUTPATIENT)
Dept: OUTPATIENT SERVICES | Facility: HOSPITAL | Age: 44
LOS: 1 days | End: 2024-05-23

## 2024-05-23 PROCEDURE — A9541: CPT

## 2024-05-23 PROCEDURE — 78264 GASTRIC EMPTYING IMG STUDY: CPT | Mod: 26

## 2024-05-23 PROCEDURE — 78264 GASTRIC EMPTYING IMG STUDY: CPT

## 2024-05-30 ENCOUNTER — APPOINTMENT (OUTPATIENT)
Dept: THORACIC SURGERY | Facility: CLINIC | Age: 44
End: 2024-05-30
Payer: MEDICAID

## 2024-05-30 PROCEDURE — 99212 OFFICE O/P EST SF 10 MIN: CPT

## 2024-05-30 NOTE — HISTORY OF PRESENT ILLNESS
[FreeTextEntry1] : Ms. Mortensen is a 45 y/o F, never smoker, w/ a PMHx of chronic cough, abdominal pain with bloating, chronic constipation, Type I DM on an insulin pump, elevated liver enzymes, and thyroid nodules. She has no significant PSHx and her family hx is negative for CAD or neoplasm.  She is referred to our practice by her gastroenterologist Dr. Zoya Mckinley for an evaluation of a hiatal hernia.   Endoscopy performed on 2/15/24 showed a hiatal hernia, the EGJ was noted at 40cm with narrowing at 42cm from the incisors. Retroflexion view in the stomach confirmed the size and morphology- Hill Grade II. Irregularity in the area at and just proximal to the squamo-columnar junction (biopsied). Erythema and ulceration in the stomach body (biopsied). Ulceration and erythema in the second part of the duodenum (biopsied).  Pathology from Endoscopy on 2/15/24 of the biopsy of the duodenum showed focal active duodenitis. It was negative for pathogenic organisms The biopsy of the stomach consisted of gastric mucosa without significant histologic abnormality. It was negative for Helicobacter pylori on immunohistochemical stain. The biopsy of the lower esophagus consisted of squamo-columnar mucosa showing active chronic inflammation of cardia mucosa. There was no specialized columnar mucosa of Chavez's type identified.   The patient made dietary modifications- gluten free, mostly dairy free, and avoids processed foods; she is vegan.  During her initial visit in March, she reported that her main complaint is bloating and gas after eating. Currently, she is taking Omeprazole 40mg for her symptoms along with multiple TUMS per day. She does still experience acid reflux on medication. She denies food sticking or vomiting. She is planned to have an upper endoscopy on 4/18/24 to reassess the erosions that were noted on her February study.   She presents today via telehealth to review additional GI workup noted below.  EGD 4/18/24: Esophageal hiatal hernia Significantly improved erythema and almost completely resolved erosions in the stomach body (biopsied) Erythema and small erosions in the first part of the duodenum and second part of the duodenum (biopsied)   Pathology from EGD on 4/18/24: 1.  Duodenum: -   Duodenal mucosa with mildly active duodenitis  2.  Random gastric: -   Gastric antral and fundal mucosa within normal limits -   Negative for H. pylori  UGI performed on 4/25/24: Normal UGI. No hiatal hernia seen   CT Chest non-con performed on 4/25/24: No hiatal hernia is seen. No acute pathology.   24HR pH Impedance study 5/2/24: OFF therapy study Gastric pH was < 4 for 90.3% of the overall study time w/ a pH < 4 for 93.4% of the time upright and 85.9% of the time supine while off acid suppression Throughout the study, there were 12 episodes of reflux with 0 long episodes of reflux.  DeMeester Score: 1.7 (Normal < 14.7) Mean nocturnal baseline impedance was 4.72 (normal > 3.0) The study is NOT consistent with pathologic GERD. Advise seeking alternate etiologies for reported symptoms   Manometry 5/2/24: 1. Normal basal LES pressures with INadequate deglutitive relaxation on supine swallows, with residual IRP 22.0 mmHg (normal <15mmHg) 2. On supine swallows, 80% of swallows demonstrate normal amplitude peristalsis. The remaining 20% are fragmented. 5 of 8 intact swallows appear rapid by CFV.  3. 60% of swallows in the supine position demonstrate INcomplete bolus clearance  4. Manometric evidence of an intermittently visible sliding 1.0cm sliding HH  5. On upright swallows, 4 out of 6 swallows are weak or failed, with 2 of these 4 demonstrating panesophageal pressurization, with the remaining 2 out of 6 demonstrating normal amplitude peristalsis.  6. Inadequate deglutitive relaxation of the LES on upright swallows (IRP > 12) 7. On multiple rapid swallow, patient had adequate peristaltic reserve  8. On 200cc rapid bolus challenge, patient has appropriate relaxation of the LES 9. Solid challenge with normal amplitude peristalsis and appropriate deglutitive LES relaxation.   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 Normal 200cc rapid bolus challenge does not support diagnosis of EGJOO. Recommend timed barium esophagram +/- EndoFLIP for further evaluation.     Gastric Emptying Study performed on 5/23/24: Retained activity was approximately 25% at one hour and 20% at 1.5 hours.  At the end of the study, less than 10% remained.  The T1/2 was about 25 minutes (WNL; normal < 120 mins).  Normal values for gastric emptying include: < 86-90% at one hour, < 50-60% at two hours, < 6-10% at four hours.  Impression-  Rapid Gastric Emptying   She reports feeling better off PPis and Miralax.

## 2024-05-30 NOTE — ASSESSMENT
[FreeTextEntry1] : 43 y/o F, never smoker, w/ a PMHx of chronic cough, abdominal pain with bloating, chronic constipation, Type I DM on an insulin pump, elevated liver enzymes, and thyroid nodules. She has no significant PSHx and her family hx is negative for CAD or neoplasm.  She is referred to our practice by her gastroenterologist Dr. Zoya Mckinley for an evaluation of a hiatal hernia.   Endoscopy performed on 2/15/24 showed a hiatal hernia, the EGJ was noted at 40cm with narrowing at 42cm from the incisors. Retroflexion view in the stomach confirmed the size and morphology- Hill Grade II. Irregularity in the area at and just proximal to the squamo-columnar junction (biopsied). Erythema and ulceration in the stomach body (biopsied). Ulceration and erythema in the second part of the duodenum (biopsied).  Pathology from Endoscopy on 2/15/24 of the biopsy of the duodenum showed focal active duodenitis. It was negative for pathogenic organisms The biopsy of the stomach consisted of gastric mucosa without significant histologic abnormality. It was negative for Helicobacter pylori on immunohistochemical stain. The biopsy of the lower esophagus consisted of squamo-columnar mucosa showing active chronic inflammation of cardia mucosa. There was no specialized columnar mucosa of Chavez's type identified.   The patient made dietary modifications- gluten free, mostly dairy free, and avoids processed foods; she is vegan.  During her initial visit in March, she reported that her main complaint is bloating and gas after eating. Currently, she is taking Omeprazole 40mg for her symptoms along with multiple TUMS per day. She does still experience acid reflux on medication. She denies food sticking or vomiting. She is planned to have an upper endoscopy on 4/18/24 to reassess the erosions that were noted on her February study.  She presents today via telehealth to review additional GI workup noted below.  EGD 4/18/24: Esophageal hiatal hernia Significantly improved erythema and almost completely resolved erosions in the stomach body (biopsied) Erythema and small erosions in the first part of the duodenum and second part of the duodenum (biopsied)   Pathology from EGD on 4/18/24: 1.  Duodenum: -   Duodenal mucosa with mildly active duodenitis  2.  Random gastric: -   Gastric antral and fundal mucosa within normal limits -   Negative for H. pylori  UGI performed on 4/25/24: Normal UGI. No hiatal hernia seen   CT Chest non-con performed on 4/25/24: No hiatal hernia is seen. No acute pathology.   24HR pH Impedance study 5/2/24: OFF therapy study Gastric pH was < 4 for 90.3% of the overall study time w/ a pH < 4 for 93.4% of the time upright and 85.9% of the time supine while off acid suppression Throughout the study, there were 12 episodes of reflux with 0 long episodes of reflux.  DeMeester Score: 1.7 (Normal < 14.7) Mean nocturnal baseline impedance was 4.72 (normal > 3.0) The study is NOT consistent with pathologic GERD. Advise seeking alternate etiologies for reported symptoms   Manometry 5/2/24: 1. Normal basal LES pressures with INadequate deglutitive relaxation on supine swallows, with residual IRP 22.0 mmHg (normal <15mmHg) 2. On supine swallows, 80% of swallows demonstrate normal amplitude peristalsis. The remaining 20% are fragmented. 5 of 8 intact swallows appear rapid by CFV.  3. 60% of swallows in the supine position demonstrate INcomplete bolus clearance  4. Manometric evidence of an intermittently visible sliding 1.0cm sliding HH  5. On upright swallows, 4 out of 6 swallows are weak or failed, with 2 of these 4 demonstrating panesophageal pressurization, with the remaining 2 out of 6 demonstrating normal amplitude peristalsis.  6. Inadequate deglutitive relaxation of the LES on upright swallows (IRP > 12) 7. On multiple rapid swallow, patient had adequate peristaltic reserve  8. On 200cc rapid bolus challenge, patient has appropriate relaxation of the LES 9. Solid challenge with normal amplitude peristalsis and appropriate deglutitive LES relaxation.   Impression: By [place] Classification v4.0, this study is consistent with Esophagogastric Junction Outflow Obstruction (EGJOO) in the setting of small HH. Normal 200cc rapid bolus challenge does not support diagnosis of EGJOO. Recommend timed barium esophagram +/- EndoFLIP for further evaluation.    Gastric Emptying Study performed on 5/23/24: Retained activity was approximately 25% at one hour and 20% at 1.5 hours.  At the end of the study, less than 10% remained.  The T1/2 was about 25 minutes (WNL; normal < 120 mins).  Normal values for gastric emptying include: < 86-90% at one hour, < 50-60% at two hours, < 6-10% at four hours.  Impression-  Rapid Gastric Emptying   She reports feeling better off PPis and Miralax.  Her studies including her chest CT, UGI, pH study, and manometry were reviewed. She does not appear to have an acid reflux on her pH study nor a significant hiatal hernia. She may have a motility disorder for which she is seeing Dr. Mckinley and Dr. Cee. Given she has no acid reflux, a fundoplication would not be helpful. She will followup with us as needed.  Plan: Followup PRN  I, Dr. Lisy Restrepo MD, personally performed the evaluation and management (E/M) services for this established patient who presents today with (a) new problem(s)/exacerbation of (an) existing condition(s).  That E/M includes conducting the clinically appropriate interval history &/or exam, assessing all new/exacerbated conditions, and establishing a new plan of care. I have also independently reviewed the medical records and imaging at the time of this office visit, and discussed the above mentioned images with interpretations with the patient. Today, my YVONNE was here to observe &/or participate in the visit & follow plan of care established by me.  Total time of 15 minutes with > 50% spent with the patient discussing radiologic studies.

## 2024-06-05 ENCOUNTER — TRANSCRIPTION ENCOUNTER (OUTPATIENT)
Age: 44
End: 2024-06-05

## 2024-06-13 ENCOUNTER — APPOINTMENT (OUTPATIENT)
Dept: FAMILY MEDICINE | Facility: CLINIC | Age: 44
End: 2024-06-13
Payer: MEDICAID

## 2024-06-13 VITALS
HEART RATE: 66 BPM | TEMPERATURE: 98 F | OXYGEN SATURATION: 98 % | SYSTOLIC BLOOD PRESSURE: 121 MMHG | BODY MASS INDEX: 21.22 KG/M2 | HEIGHT: 68 IN | WEIGHT: 140 LBS | DIASTOLIC BLOOD PRESSURE: 79 MMHG

## 2024-06-13 DIAGNOSIS — R92.8 OTHER ABNORMAL AND INCONCLUSIVE FINDINGS ON DIAGNOSTIC IMAGING OF BREAST: ICD-10-CM

## 2024-06-13 DIAGNOSIS — M25.552 PAIN IN LEFT HIP: ICD-10-CM

## 2024-06-13 DIAGNOSIS — Z97.5 PRESENCE OF (INTRAUTERINE) CONTRACEPTIVE DEVICE: ICD-10-CM

## 2024-06-13 DIAGNOSIS — L30.9 DERMATITIS, UNSPECIFIED: ICD-10-CM

## 2024-06-13 DIAGNOSIS — H59.812 CHORIORETINAL SCARS AFTER SURGERY FOR DETACHMENT, LEFT EYE: ICD-10-CM

## 2024-06-13 DIAGNOSIS — R14.3 FLATULENCE: ICD-10-CM

## 2024-06-13 DIAGNOSIS — H93.8X1 OTHER SPECIFIED DISORDERS OF RIGHT EAR: ICD-10-CM

## 2024-06-13 DIAGNOSIS — Z11.3 ENCOUNTER FOR SCREENING FOR INFECTIONS WITH A PREDOMINANTLY SEXUAL MODE OF TRANSMISSION: ICD-10-CM

## 2024-06-13 DIAGNOSIS — E04.1 NONTOXIC SINGLE THYROID NODULE: ICD-10-CM

## 2024-06-13 DIAGNOSIS — H61.21 IMPACTED CERUMEN, RIGHT EAR: ICD-10-CM

## 2024-06-13 DIAGNOSIS — Z23 ENCOUNTER FOR IMMUNIZATION: ICD-10-CM

## 2024-06-13 DIAGNOSIS — R74.01 ELEVATION OF LEVELS OF LIVER TRANSAMINASE LEVELS: ICD-10-CM

## 2024-06-13 DIAGNOSIS — Z87.898 PERSONAL HISTORY OF OTHER SPECIFIED CONDITIONS: ICD-10-CM

## 2024-06-13 DIAGNOSIS — H60.91 UNSPECIFIED OTITIS EXTERNA, RIGHT EAR: ICD-10-CM

## 2024-06-13 DIAGNOSIS — Z87.19 PERSONAL HISTORY OF OTHER DISEASES OF THE DIGESTIVE SYSTEM: ICD-10-CM

## 2024-06-13 DIAGNOSIS — L81.8 OTHER SPECIFIED DISORDERS OF PIGMENTATION: ICD-10-CM

## 2024-06-13 DIAGNOSIS — E04.2 NONTOXIC MULTINODULAR GOITER: ICD-10-CM

## 2024-06-13 DIAGNOSIS — Z78.9 OTHER SPECIFIED HEALTH STATUS: ICD-10-CM

## 2024-06-13 DIAGNOSIS — H92.01 OTALGIA, RIGHT EAR: ICD-10-CM

## 2024-06-13 DIAGNOSIS — E10.9 TYPE 1 DIABETES MELLITUS W/OUT COMPLICATIONS: ICD-10-CM

## 2024-06-13 DIAGNOSIS — Z86.03 PERSONAL HISTORY OF NEOPLASM OF UNCERTAIN BEHAVIOR: ICD-10-CM

## 2024-06-13 DIAGNOSIS — L30.8 OTHER SPECIFIED DERMATITIS: ICD-10-CM

## 2024-06-13 DIAGNOSIS — R14.0 ABDOMINAL DISTENSION (GASEOUS): ICD-10-CM

## 2024-06-13 DIAGNOSIS — B36.0 PITYRIASIS VERSICOLOR: ICD-10-CM

## 2024-06-13 DIAGNOSIS — Z86.39 PERSONAL HISTORY OF OTHER ENDOCRINE, NUTRITIONAL AND METABOLIC DISEASE: ICD-10-CM

## 2024-06-13 DIAGNOSIS — Z00.00 ENCOUNTER FOR GENERAL ADULT MEDICAL EXAMINATION W/OUT ABNORMAL FINDINGS: ICD-10-CM

## 2024-06-13 DIAGNOSIS — Z01.812 ENCOUNTER FOR PREPROCEDURAL LABORATORY EXAMINATION: ICD-10-CM

## 2024-06-13 DIAGNOSIS — F41.8 OTHER SPECIFIED ANXIETY DISORDERS: ICD-10-CM

## 2024-06-13 DIAGNOSIS — Z87.2 PERSONAL HISTORY OF DISEASES OF THE SKIN AND SUBCUTANEOUS TISSUE: ICD-10-CM

## 2024-06-13 DIAGNOSIS — Z92.29 PERSONAL HISTORY OF OTHER DRUG THERAPY: ICD-10-CM

## 2024-06-13 DIAGNOSIS — Z01.419 ENCOUNTER FOR GYNECOLOGICAL EXAMINATION (GENERAL) (ROUTINE) W/OUT ABNORMAL FINDINGS: ICD-10-CM

## 2024-06-13 DIAGNOSIS — R74.8 ABNORMAL LEVELS OF OTHER SERUM ENZYMES: ICD-10-CM

## 2024-06-13 DIAGNOSIS — L98.9 DISORDER OF THE SKIN AND SUBCUTANEOUS TISSUE, UNSPECIFIED: ICD-10-CM

## 2024-06-13 DIAGNOSIS — I49.3 VENTRICULAR PREMATURE DEPOLARIZATION: ICD-10-CM

## 2024-06-13 DIAGNOSIS — R00.2 PALPITATIONS: ICD-10-CM

## 2024-06-13 DIAGNOSIS — Z12.11 ENCOUNTER FOR SCREENING FOR MALIGNANT NEOPLASM OF COLON: ICD-10-CM

## 2024-06-13 DIAGNOSIS — L29.9 PRURITUS, UNSPECIFIED: ICD-10-CM

## 2024-06-13 DIAGNOSIS — R19.7 DIARRHEA, UNSPECIFIED: ICD-10-CM

## 2024-06-13 DIAGNOSIS — Z86.19 PERSONAL HISTORY OF OTHER INFECTIOUS AND PARASITIC DISEASES: ICD-10-CM

## 2024-06-13 DIAGNOSIS — E55.9 VITAMIN D DEFICIENCY, UNSPECIFIED: ICD-10-CM

## 2024-06-13 PROCEDURE — 99386 PREV VISIT NEW AGE 40-64: CPT | Mod: 25

## 2024-06-13 PROCEDURE — 93000 ELECTROCARDIOGRAM COMPLETE: CPT

## 2024-06-13 PROCEDURE — 99204 OFFICE O/P NEW MOD 45 MIN: CPT | Mod: 25

## 2024-06-13 RX ORDER — OMEPRAZOLE 40 MG/1
40 CAPSULE, DELAYED RELEASE ORAL DAILY
Qty: 30 | Refills: 1 | Status: DISCONTINUED | COMMUNITY
Start: 2024-02-16 | End: 2024-06-13

## 2024-06-13 RX ORDER — LORATADINE 5 MG
17 TABLET,CHEWABLE ORAL DAILY
Qty: 1 | Refills: 2 | Status: ACTIVE | COMMUNITY
Start: 2023-03-06

## 2024-06-13 RX ORDER — OMEPRAZOLE 20 MG/1
20 CAPSULE, DELAYED RELEASE ORAL DAILY
Qty: 30 | Refills: 3 | Status: DISCONTINUED | COMMUNITY
Start: 2024-04-12 | End: 2024-06-13

## 2024-06-13 RX ORDER — UBIDECARENONE/VIT E ACET 100MG-5
25 MCG CAPSULE ORAL
Refills: 0 | Status: ACTIVE | COMMUNITY

## 2024-06-13 RX ORDER — FERROUS SULFATE 325(65) MG
325 (65 FE) TABLET ORAL
Qty: 180 | Refills: 0 | Status: ACTIVE | COMMUNITY
Start: 2024-06-13 | End: 1900-01-01

## 2024-06-13 RX ORDER — PROPRANOLOL HYDROCHLORIDE 10 MG/1
10 TABLET ORAL
Qty: 30 | Refills: 0 | Status: ACTIVE | COMMUNITY
Start: 2024-06-13 | End: 1900-01-01

## 2024-06-13 RX ORDER — MULTIVIT-MIN/FOLIC/VIT K/LYCOP 400-300MCG
TABLET ORAL DAILY
Qty: 1 | Refills: 0 | Status: ACTIVE | COMMUNITY
Start: 2024-06-13

## 2024-06-13 NOTE — HEALTH RISK ASSESSMENT
[Good] : ~his/her~  mood as  good [2 - 3 times a week (3 pts)] : 2 - 3  times a week (3 points) [1 or 2 (0 pts)] : 1 or 2 (0 points) [Never (0 pts)] : Never (0 points) [No] : In the past 12 months have you used drugs other than those required for medical reasons? No [0] : 2) Feeling down, depressed, or hopeless: Not at all (0) [PHQ-2 Negative - No further assessment needed] : PHQ-2 Negative - No further assessment needed [Patient reported mammogram was normal] : Patient reported mammogram was normal [Patient reported PAP Smear was abnormal] : Patient reported PAP Smear was abnormal [Patient reported colonoscopy was normal] : Patient reported colonoscopy was normal [HIV test declined] : HIV test declined [Hepatitis C test declined] : Hepatitis C test declined [None] : None [With Significant Other] : lives with significant other [Employed] : employed [] :  [Sexually Active] : sexually active [Feels Safe at Home] : Feels safe at home [Fully functional (bathing, dressing, toileting, transferring, walking, feeding)] : Fully functional (bathing, dressing, toileting, transferring, walking, feeding) [Fully functional (using the telephone, shopping, preparing meals, housekeeping, doing laundry, using] : Fully functional and needs no help or supervision to perform IADLs (using the telephone, shopping, preparing meals, housekeeping, doing laundry, using transportation, managing medications and managing finances) [Never] : Never [Audit-CScore] : 3 [de-identified] : exercises [de-identified] : fruits, veggies, veganism   [[id number]] : 0 [Change in mental status noted] : No change in mental status noted [Language] : denies difficulty with language [Reports changes in hearing] : Reports no changes in hearing [Reports changes in vision] : Reports no changes in vision [MammogramDate] : 06/23 [MammogramComments] : follow up next week  [PapSmearComments] : follow up pending  [ColonoscopyDate] : 05/22 [FreeTextEntry2] :   [de-identified] : m

## 2024-06-13 NOTE — PLAN
[FreeTextEntry1] : following with endo following with [facility]  presented with labs from [facility]: cbc, cmp, lipids, tsh reviewed and wnl  also with elevated a1c, c/w dm1  performance anxiety  interested in trial of beta blocker  reviewed risks, benefits, side effects, alternatives, regimen   chronic pain in left hip with activity  will send for imaging

## 2024-06-18 ENCOUNTER — NON-APPOINTMENT (OUTPATIENT)
Age: 44
End: 2024-06-18

## 2024-06-20 ENCOUNTER — APPOINTMENT (OUTPATIENT)
Dept: DERMATOLOGY | Facility: CLINIC | Age: 44
End: 2024-06-20
Payer: MEDICAID

## 2024-06-20 DIAGNOSIS — L30.9 DERMATITIS, UNSPECIFIED: ICD-10-CM

## 2024-06-20 DIAGNOSIS — L72.0 EPIDERMAL CYST: ICD-10-CM

## 2024-06-20 DIAGNOSIS — I87.8 OTHER SPECIFIED DISORDERS OF VEINS: ICD-10-CM

## 2024-06-20 DIAGNOSIS — Z12.83 ENCOUNTER FOR SCREENING FOR MALIGNANT NEOPLASM OF SKIN: ICD-10-CM

## 2024-06-20 PROCEDURE — 99214 OFFICE O/P EST MOD 30 MIN: CPT

## 2024-06-20 RX ORDER — TRIAMCINOLONE ACETONIDE 1 MG/G
0.1 CREAM TOPICAL
Qty: 1 | Refills: 11 | Status: ACTIVE | COMMUNITY
Start: 2019-03-27 | End: 1900-01-01

## 2024-06-20 RX ORDER — MUPIROCIN 20 MG/G
2 OINTMENT TOPICAL
Qty: 1 | Refills: 11 | Status: ACTIVE | COMMUNITY
Start: 2019-07-01 | End: 1900-01-01

## 2024-06-20 RX ORDER — TRETINOIN 0.25 MG/G
0.03 CREAM TOPICAL
Qty: 1 | Refills: 11 | Status: ACTIVE | COMMUNITY
Start: 2019-08-12 | End: 1900-01-01

## 2024-06-20 NOTE — HISTORY OF PRESENT ILLNESS
[FreeTextEntry1] : fu moles, contact derm [de-identified] : LV april 2023 no hx skin cancer hx contact derm 2/2 cgm monitor some leg swelling/discoloration

## 2024-06-20 NOTE — PHYSICAL EXAM
[Alert] : alert [Oriented x 3] : ~L oriented x 3 [Well Nourished] : well nourished [Conjunctiva Non-injected] : conjunctiva non-injected [No Visual Lymphadenopathy] : no visual  lymphadenopathy [No Clubbing] : no clubbing [No Edema] : no edema [No Bromhidrosis] : no bromhidrosis [No Chromhidrosis] : no chromhidrosis [Full Body Skin Exam Performed] : performed [FreeTextEntry3] : tan skin tele chin hemosiderin deposition legs

## 2024-06-24 ENCOUNTER — RESULT REVIEW (OUTPATIENT)
Age: 44
End: 2024-06-24

## 2024-06-24 ENCOUNTER — APPOINTMENT (OUTPATIENT)
Dept: MAMMOGRAPHY | Facility: CLINIC | Age: 44
End: 2024-06-24
Payer: MEDICAID

## 2024-06-24 ENCOUNTER — APPOINTMENT (OUTPATIENT)
Dept: RADIOLOGY | Facility: CLINIC | Age: 44
End: 2024-06-24
Payer: MEDICAID

## 2024-06-24 ENCOUNTER — OUTPATIENT (OUTPATIENT)
Dept: OUTPATIENT SERVICES | Facility: HOSPITAL | Age: 44
LOS: 1 days | End: 2024-06-24

## 2024-06-24 ENCOUNTER — APPOINTMENT (OUTPATIENT)
Dept: ULTRASOUND IMAGING | Facility: CLINIC | Age: 44
End: 2024-06-24
Payer: MEDICAID

## 2024-06-24 PROCEDURE — 77063 BREAST TOMOSYNTHESIS BI: CPT | Mod: 26

## 2024-06-24 PROCEDURE — 73502 X-RAY EXAM HIP UNI 2-3 VIEWS: CPT | Mod: 26,LT

## 2024-06-24 PROCEDURE — 77067 SCR MAMMO BI INCL CAD: CPT | Mod: 26

## 2024-07-16 ENCOUNTER — APPOINTMENT (OUTPATIENT)
Dept: DERMATOLOGY | Facility: CLINIC | Age: 44
End: 2024-07-16
Payer: MEDICAID

## 2024-07-16 ENCOUNTER — APPOINTMENT (OUTPATIENT)
Dept: DERMATOLOGY | Facility: CLINIC | Age: 44
End: 2024-07-16
Payer: SELF-PAY

## 2024-07-16 DIAGNOSIS — I78.1 NEVUS, NON-NEOPLASTIC: ICD-10-CM

## 2024-07-16 DIAGNOSIS — Z41.1 ENCOUNTER FOR COSMETIC SURGERY: ICD-10-CM

## 2024-07-16 DIAGNOSIS — L72.0 EPIDERMAL CYST: ICD-10-CM

## 2024-07-16 PROCEDURE — D0159D: CUSTOM

## 2024-07-16 PROCEDURE — D0098R: CUSTOM

## 2024-07-16 PROCEDURE — 99213 OFFICE O/P EST LOW 20 MIN: CPT

## 2024-07-17 ENCOUNTER — TRANSCRIPTION ENCOUNTER (OUTPATIENT)
Age: 44
End: 2024-07-17

## 2024-07-24 ENCOUNTER — TRANSCRIPTION ENCOUNTER (OUTPATIENT)
Age: 44
End: 2024-07-24

## 2024-07-25 ENCOUNTER — APPOINTMENT (OUTPATIENT)
Dept: PHYSICAL MEDICINE AND REHAB | Facility: CLINIC | Age: 44
End: 2024-07-25
Payer: MEDICAID

## 2024-07-25 VITALS
BODY MASS INDEX: 21.22 KG/M2 | WEIGHT: 140 LBS | SYSTOLIC BLOOD PRESSURE: 120 MMHG | RESPIRATION RATE: 18 BRPM | DIASTOLIC BLOOD PRESSURE: 84 MMHG | HEIGHT: 68 IN | HEART RATE: 74 BPM

## 2024-07-25 DIAGNOSIS — G89.29 PAIN IN LEFT HIP: ICD-10-CM
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
## 2024-07-25 DIAGNOSIS — M25.552 PAIN IN LEFT HIP: ICD-10-CM

## 2024-07-25 DIAGNOSIS — M25.60 STIFFNESS OF UNSPECIFIED JOINT, NOT ELSEWHERE CLASSIFIED: ICD-10-CM

## 2024-07-25 PROCEDURE — G2211 COMPLEX E/M VISIT ADD ON: CPT | Mod: NC

## 2024-07-25 PROCEDURE — 99204 OFFICE O/P NEW MOD 45 MIN: CPT

## 2024-07-25 RX ORDER — NAPROXEN 500 MG/1
500 TABLET ORAL
Qty: 60 | Refills: 0 | Status: ACTIVE | COMMUNITY
Start: 2024-07-25 | End: 2024-08-24

## 2024-07-25 NOTE — REVIEW OF SYSTEMS
[Fever] : no fever [Chills] : no chills [Joint Pain] : joint pain [Joint Stiffness] : joint stiffness [Muscle Pain] : muscle pain [Muscle Weakness] : no muscle weakness [Difficulty Walking] : difficulty walking

## 2024-07-25 NOTE — PHYSICAL EXAM
[FreeTextEntry1] : PHYSICAL EXAM : OBJECTIVE   GENERAL : Awake ,alert and oriented to time place and person  HEAD : normocephalic and atraumatic  NECK : supple ,no tracheal deviation ,no thyroid enlargement noted with swallowing EYES : sclera and conjunctiva normal no redness,intact extraocular movements  ENT  : ears and nose normal in appearance -hearing adequate  PULMONARY: effort normal. No respiratory distress. breathing regular. No wheezes  LYMPH : No swelling in limbs, capillary return within normal range  CVS : warm extremities,no palpitations,not short of breath, no visible jugular venous distention PSYCH : mood and affect normal ,good eye contact ,normal attention  ABDOMEN : no visible distension ,  NEUROLOGICAL:cranial nerves intact,muscle tone normal,gait and balance safe except where noted below  SKIN : warm and dry No rash detected over specific body areas examined  MUSCULOSKELETAL: normal muscle bulk, no focal bony tenderness /posture normal except where specified below pain with left hip flexion  abduction ok  MMT 5/5  no wasting quags  R hip higher on stance  no scoliosis seen  gait NL  ROM spine good pain free

## 2024-07-25 NOTE — HISTORY OF PRESENT ILLNESS
[FreeTextEntry1] :  Ms. SAJI BLAS is a very pleasant -diabetic insulin dependent type 1 since age 26 -44-year female who comes in for evaluation of left hip pain   that has been ongoing since 2016 without any specific injury or inciting event but after she gave birth in 2016 to her daughter . The pain is located primarily left hip groin area intermittent in nature and described as sharp, shooting, achy. The pain is rated as 2/10 during today's visit, and ranges from 2-9/10. The patient's symptoms are aggravated by bending, sometimes walking, running, jogging and alleviated by not sure. The patient denies any night pain, numbness/tingling, weakness, or bowel/bladder dysfunction. The patient has no other complaints at this time.   The patient has previously tried massages before the pandemic which help.  She is doing yoga and Pilates every day.  She thinks her pain can be related to  running.    The patient works as a  and musician.  X ray done 06/24/2024 - left hip - normal

## 2024-07-25 NOTE — ASSESSMENT
[FreeTextEntry1] :   PLAN AND RECOMMENDATIONS :   We discussed differential diagnosis and clinical impression  advise MR -she may have a labral tear   Recommend .symptomatic care and support  medications advise prescription NSAIDS for both pain and anti inflammation  to help with healing / calming the soft tissue and reducing swelling- for at least 2 weeks strict -Naprosyn 500 mg BID after food -warned of possible GI side effects -advised to take with meals or add over the counter Nexium, if sensitive- if GI upset (nausea, vomiting, heartburn) becomes noticeable -stop medication and notify the office.     imaging MR left hip no contrast   referral to ortho after   hydrotherapy /heat / cold for pain  continue   precautions including care with bending, lifting, twisting and  carrying. advise biking instead of jogging  cont yoga   relative rest and avoidance of painful activity where possible  increasing activity as discussed  return for follow up.after imaging  The patient had the opportunity to ask questions and all were answered to their satisfaction. We will coordinate treatment with the other members of the treatment team. The patient verbalized understanding of the management/plan rationale and agreed with my recommendations.

## 2024-07-25 NOTE — REASON FOR VISIT
[Initial Evaluation] : an initial evaluation [FreeTextEntry1] : left hip pain referred by Dr. العراقي

## 2024-07-25 NOTE — CONSULT LETTER
[FreeTextEntry1] : Dear Dr. GILL DEAN  ,   I had the pleasure of evaluating your patient, SAJI BLAS .  Thank you very much for allowing me to participate in the care of this patient. If you have any questions, please do not hesitate to contact me.   Sincerely,  Chani Hinojosa MD   ABPMR Board Certified in Physical Medicine and Rehabilitation Certified Fellow of AANEM (Neuromuscular and Electrodiagnostic Medicine) Subspecialty certified in Sports Medicine (ABPMR)   of Physical Medicine and Rehabilitation [facility] [employer] [facility]

## 2024-07-26 ENCOUNTER — TRANSCRIPTION ENCOUNTER (OUTPATIENT)
Age: 44
End: 2024-07-26

## 2024-08-02 ENCOUNTER — APPOINTMENT (OUTPATIENT)
Dept: MRI IMAGING | Facility: CLINIC | Age: 44
End: 2024-08-02
Payer: MEDICAID

## 2024-08-02 ENCOUNTER — OUTPATIENT (OUTPATIENT)
Dept: OUTPATIENT SERVICES | Facility: HOSPITAL | Age: 44
LOS: 1 days | End: 2024-08-02

## 2024-08-02 PROCEDURE — 73721 MRI JNT OF LWR EXTRE W/O DYE: CPT | Mod: 26,LT

## 2024-09-10 ENCOUNTER — NON-APPOINTMENT (OUTPATIENT)
Age: 44
End: 2024-09-10

## 2024-09-11 ENCOUNTER — TRANSCRIPTION ENCOUNTER (OUTPATIENT)
Age: 44
End: 2024-09-11

## 2024-09-13 ENCOUNTER — TRANSCRIPTION ENCOUNTER (OUTPATIENT)
Age: 44
End: 2024-09-13

## 2024-09-16 ENCOUNTER — APPOINTMENT (OUTPATIENT)
Dept: GASTROENTEROLOGY | Facility: CLINIC | Age: 44
End: 2024-09-16

## 2024-09-19 ENCOUNTER — APPOINTMENT (OUTPATIENT)
Dept: ENDOCRINOLOGY | Facility: CLINIC | Age: 44
End: 2024-09-19
Payer: MEDICAID

## 2024-09-19 VITALS
BODY MASS INDEX: 21.59 KG/M2 | SYSTOLIC BLOOD PRESSURE: 130 MMHG | DIASTOLIC BLOOD PRESSURE: 84 MMHG | WEIGHT: 142 LBS | HEART RATE: 77 BPM

## 2024-09-19 DIAGNOSIS — E10.9 TYPE 1 DIABETES MELLITUS W/OUT COMPLICATIONS: ICD-10-CM

## 2024-09-19 DIAGNOSIS — Z13.220 ENCOUNTER FOR SCREENING FOR LIPOID DISORDERS: ICD-10-CM

## 2024-09-19 DIAGNOSIS — Z78.9 OTHER SPECIFIED HEALTH STATUS: ICD-10-CM

## 2024-09-19 DIAGNOSIS — E04.2 NONTOXIC MULTINODULAR GOITER: ICD-10-CM

## 2024-09-19 LAB
GLUCOSE BLDC GLUCOMTR-MCNC: 108
HBA1C MFR BLD HPLC: 6.2

## 2024-09-19 PROCEDURE — 83036 HEMOGLOBIN GLYCOSYLATED A1C: CPT | Mod: QW

## 2024-09-19 PROCEDURE — 82962 GLUCOSE BLOOD TEST: CPT

## 2024-09-19 PROCEDURE — 99214 OFFICE O/P EST MOD 30 MIN: CPT | Mod: 25

## 2024-09-19 PROCEDURE — G2211 COMPLEX E/M VISIT ADD ON: CPT | Mod: NC

## 2024-09-19 NOTE — HISTORY OF PRESENT ILLNESS
[FreeTextEntry1] : 39 y. o. female, not previously seen by me,  with a h/o DM since 2006. Is thought to have type 1 Dm, but C-peptide was still detectable in 5/2017 (0.4). Anti-islet cell and anti-TIM AB positive. She is using T-Slim insulin pump and CGM. BSs are overall in good control but she does experience mild hypoglycemic reactions occasionally, without LOC. She is not known to have retinopathy, neuropathy or nephropathy. She has a 3.5 y.o. child and is not planning any pregnancies in the immediate future. HbA1C today is 6.2%, glucose - 115 mg/dl. Her weight has been stable. About 1 yr ago she was found to have a thyroid nodule. FNAB was c/w [place] 4, but molecular studies were c/w low risk of cancer (4%). TFTs have been normal and anti-thyroid AB negative. 9/17/2020. The patient is doing well. She has gained 8 lb. Is now on tandem insulin pump. Has occasional mild hypoglycemia. HbA1C today is 6.1%, glucose - 172 mg/dl. Thyroid u/sound 9/11/2020 unchanged when compared to 12/3/2018. 6/20/22. The patient is doing well. She has lost 3 lb. She reports no hypoglycemia. Her last ophthalm. exam was recently, reportedly without any evidence of retinopathy. HbA1C today is 5.5%, glucose - 107 mg/dl. Last thyroid u/sound 4/28/22 - stable. 7/20/23. The patient is doing well - has no complaints. She has lost 20 lb. HbA1C was 6% on 6/12/23; glucose today is 170 mg/dl. She reports no hypoglycemia. Thyroid u/sound on 6/26/23 - stable when compared to 2020. 9/19/24. The patient is feeling well - has no complaints. Her weight is stable. She reports no hypoglycemia. Glucose today is 108 mg/dl. FNAB 8/9/22 - [place] 3; Afirma cancer risk - 4%. Last eye exam - 3/4/24 - no retinopathy. Thyroid u/sound 6/26/23 - stable.

## 2024-09-19 NOTE — ADDENDUM
[FreeTextEntry1] : This time included review of previous records,  lab. and radiological data, discussing findings, differential diagnosis, glycemic, weight, BP and lipid goals; complications of diabetes; weight management - diet, exercise; prevention and management of hypoglycemia; further testing and evaluation, therapeutic options, renewing medications, completing the record.

## 2024-09-19 NOTE — ASSESSMENT
[FreeTextEntry1] : DM, type 1. Insulin pump tx. MNG  Lab. tests today. Repeat thyroid u/sound. Medications refilled. Will continue current management. F/U once the above results are available.

## 2024-09-20 ENCOUNTER — TRANSCRIPTION ENCOUNTER (OUTPATIENT)
Age: 44
End: 2024-09-20

## 2024-09-20 LAB
ALBUMIN SERPL ELPH-MCNC: 4.6 G/DL
ALP BLD-CCNC: 68 U/L
ALT SERPL-CCNC: 13 U/L
ANION GAP SERPL CALC-SCNC: 11 MMOL/L
AST SERPL-CCNC: 25 U/L
BASOPHILS # BLD AUTO: 0.03 K/UL
BASOPHILS NFR BLD AUTO: 0.5 %
BILIRUB SERPL-MCNC: 0.8 MG/DL
BUN SERPL-MCNC: 12 MG/DL
CALCIUM SERPL-MCNC: 9.4 MG/DL
CHLORIDE SERPL-SCNC: 104 MMOL/L
CHOLEST SERPL-MCNC: 149 MG/DL
CO2 SERPL-SCNC: 25 MMOL/L
CREAT SERPL-MCNC: 0.85 MG/DL
CREAT SPEC-SCNC: 211 MG/DL
EGFR: 86 ML/MIN/1.73M2
EOSINOPHIL # BLD AUTO: 0.02 K/UL
EOSINOPHIL NFR BLD AUTO: 0.4 %
ESTIMATED AVERAGE GLUCOSE: 137 MG/DL
GLUCOSE SERPL-MCNC: 109 MG/DL
HBA1C MFR BLD HPLC: 6.4 %
HCT VFR BLD CALC: 39 %
HDLC SERPL-MCNC: 91 MG/DL
HGB BLD-MCNC: 13.2 G/DL
IMM GRANULOCYTES NFR BLD AUTO: 0.2 %
LDLC SERPL CALC-MCNC: 47 MG/DL
LYMPHOCYTES # BLD AUTO: 1.58 K/UL
LYMPHOCYTES NFR BLD AUTO: 28.1 %
MAN DIFF?: NORMAL
MCHC RBC-ENTMCNC: 33.1 PG
MCHC RBC-ENTMCNC: 33.8 GM/DL
MCV RBC AUTO: 97.7 FL
MICROALBUMIN 24H UR DL<=1MG/L-MCNC: 1.2 MG/DL
MICROALBUMIN/CREAT 24H UR-RTO: NORMAL MG/G
MONOCYTES # BLD AUTO: 0.47 K/UL
MONOCYTES NFR BLD AUTO: 8.4 %
NEUTROPHILS # BLD AUTO: 3.51 K/UL
NEUTROPHILS NFR BLD AUTO: 62.4 %
NONHDLC SERPL-MCNC: 58 MG/DL
PLATELET # BLD AUTO: 239 K/UL
POTASSIUM SERPL-SCNC: 3.9 MMOL/L
PROT SERPL-MCNC: 7.1 G/DL
RBC # BLD: 3.99 M/UL
RBC # FLD: 12.4 %
SODIUM SERPL-SCNC: 141 MMOL/L
T3 SERPL-MCNC: 92 NG/DL
T4 FREE SERPL-MCNC: 1.4 NG/DL
THYROGLOB AB SERPL-ACNC: 17.5 IU/ML
THYROPEROXIDASE AB SERPL IA-ACNC: 16.1 IU/ML
TRIGL SERPL-MCNC: 47 MG/DL
TSH SERPL-ACNC: 0.86 UIU/ML
WBC # FLD AUTO: 5.62 K/UL

## 2024-10-07 ENCOUNTER — RX RENEWAL (OUTPATIENT)
Age: 44
End: 2024-10-07

## 2024-10-15 ENCOUNTER — NON-APPOINTMENT (OUTPATIENT)
Age: 44
End: 2024-10-15

## 2024-10-16 ENCOUNTER — TRANSCRIPTION ENCOUNTER (OUTPATIENT)
Age: 44
End: 2024-10-16

## 2024-10-16 RX ORDER — INSULIN LISPRO 100 U/ML
100 INJECTION, SOLUTION INTRAVENOUS; SUBCUTANEOUS
Qty: 9 | Refills: 3 | Status: ACTIVE | COMMUNITY
Start: 1900-01-01 | End: 1900-01-01

## 2024-10-30 ENCOUNTER — TRANSCRIPTION ENCOUNTER (OUTPATIENT)
Age: 44
End: 2024-10-30

## 2024-11-04 ENCOUNTER — RX RENEWAL (OUTPATIENT)
Age: 44
End: 2024-11-04

## 2024-11-22 ENCOUNTER — APPOINTMENT (OUTPATIENT)
Dept: DERMATOLOGY | Facility: CLINIC | Age: 44
End: 2024-11-22

## 2024-12-06 ENCOUNTER — NON-APPOINTMENT (OUTPATIENT)
Age: 44
End: 2024-12-06

## 2024-12-09 ENCOUNTER — NON-APPOINTMENT (OUTPATIENT)
Age: 44
End: 2024-12-09

## 2024-12-12 ENCOUNTER — APPOINTMENT (OUTPATIENT)
Dept: DERMATOLOGY | Facility: CLINIC | Age: 44
End: 2024-12-12
Payer: MEDICAID

## 2024-12-12 ENCOUNTER — APPOINTMENT (OUTPATIENT)
Dept: DERMATOLOGY | Facility: CLINIC | Age: 44
End: 2024-12-12
Payer: SELF-PAY

## 2024-12-12 DIAGNOSIS — H01.9 UNSPECIFIED INFLAMMATION OF EYELID: ICD-10-CM

## 2024-12-12 DIAGNOSIS — I78.1 NEVUS, NON-NEOPLASTIC: ICD-10-CM

## 2024-12-12 PROCEDURE — 99214 OFFICE O/P EST MOD 30 MIN: CPT

## 2024-12-12 PROCEDURE — D0098: CPT

## 2024-12-12 RX ORDER — TACROLIMUS 1 MG/G
0.1 OINTMENT TOPICAL
Qty: 30 | Refills: 0 | Status: ACTIVE | COMMUNITY
Start: 2024-12-12 | End: 1900-01-01

## 2025-01-14 DIAGNOSIS — L20.9 ATOPIC DERMATITIS, UNSPECIFIED: ICD-10-CM

## 2025-01-14 RX ORDER — RUXOLITINIB 15 MG/G
1.5 CREAM TOPICAL
Qty: 1 | Refills: 11 | Status: ACTIVE | COMMUNITY
Start: 2025-01-14 | End: 1900-01-01

## 2025-01-17 ENCOUNTER — TRANSCRIPTION ENCOUNTER (OUTPATIENT)
Age: 45
End: 2025-01-17

## 2025-02-13 ENCOUNTER — NON-APPOINTMENT (OUTPATIENT)
Age: 45
End: 2025-02-13

## 2025-02-13 ENCOUNTER — APPOINTMENT (OUTPATIENT)
Dept: DERMATOLOGY | Facility: CLINIC | Age: 45
End: 2025-02-13
Payer: MEDICAID

## 2025-02-13 DIAGNOSIS — L20.9 ATOPIC DERMATITIS, UNSPECIFIED: ICD-10-CM

## 2025-02-13 DIAGNOSIS — L25.9 UNSPECIFIED CONTACT DERMATITIS, UNSPECIFIED CAUSE: ICD-10-CM

## 2025-02-13 DIAGNOSIS — L23.1 ALLERGIC CONTACT DERMATITIS DUE TO ADHESIVES: ICD-10-CM

## 2025-02-13 PROCEDURE — 99214 OFFICE O/P EST MOD 30 MIN: CPT

## 2025-03-11 ENCOUNTER — NON-APPOINTMENT (OUTPATIENT)
Age: 45
End: 2025-03-11

## 2025-03-11 ENCOUNTER — APPOINTMENT (OUTPATIENT)
Dept: OPHTHALMOLOGY | Facility: CLINIC | Age: 45
End: 2025-03-11
Payer: MEDICAID

## 2025-03-11 PROCEDURE — 92250 FUNDUS PHOTOGRAPHY W/I&R: CPT

## 2025-03-11 PROCEDURE — 92014 COMPRE OPH EXAM EST PT 1/>: CPT | Mod: 25

## 2025-03-13 ENCOUNTER — APPOINTMENT (OUTPATIENT)
Dept: ENDOCRINOLOGY | Facility: CLINIC | Age: 45
End: 2025-03-13
Payer: MEDICAID

## 2025-03-13 VITALS
SYSTOLIC BLOOD PRESSURE: 126 MMHG | BODY MASS INDEX: 21.98 KG/M2 | DIASTOLIC BLOOD PRESSURE: 73 MMHG | WEIGHT: 145 LBS | HEIGHT: 68 IN | HEART RATE: 73 BPM

## 2025-03-13 DIAGNOSIS — R68.89 OTHER GENERAL SYMPTOMS AND SIGNS: ICD-10-CM

## 2025-03-13 DIAGNOSIS — E04.2 NONTOXIC MULTINODULAR GOITER: ICD-10-CM

## 2025-03-13 DIAGNOSIS — Z96.41 PRESENCE OF INSULIN PUMP (EXTERNAL) (INTERNAL): ICD-10-CM

## 2025-03-13 DIAGNOSIS — E10.9 TYPE 1 DIABETES MELLITUS W/OUT COMPLICATIONS: ICD-10-CM

## 2025-03-13 LAB — HBA1C MFR BLD HPLC: 6.9

## 2025-03-13 PROCEDURE — 82962 GLUCOSE BLOOD TEST: CPT

## 2025-03-13 PROCEDURE — 99214 OFFICE O/P EST MOD 30 MIN: CPT | Mod: 25

## 2025-03-13 PROCEDURE — 83036 HEMOGLOBIN GLYCOSYLATED A1C: CPT | Mod: QW

## 2025-03-14 ENCOUNTER — TRANSCRIPTION ENCOUNTER (OUTPATIENT)
Age: 45
End: 2025-03-14

## 2025-03-14 LAB
ALBUMIN SERPL ELPH-MCNC: 4.6 G/DL
ALP BLD-CCNC: 72 U/L
ALT SERPL-CCNC: 19 U/L
ANION GAP SERPL CALC-SCNC: 11 MMOL/L
AST SERPL-CCNC: 29 U/L
BASOPHILS # BLD AUTO: 0.02 K/UL
BASOPHILS NFR BLD AUTO: 0.3 %
BILIRUB SERPL-MCNC: 0.7 MG/DL
BUN SERPL-MCNC: 12 MG/DL
C PEPTIDE SERPL-MCNC: 0.2 NG/ML
CALCIUM SERPL-MCNC: 9.8 MG/DL
CHLORIDE SERPL-SCNC: 104 MMOL/L
CHOLEST SERPL-MCNC: 176 MG/DL
CO2 SERPL-SCNC: 25 MMOL/L
CREAT SERPL-MCNC: 0.84 MG/DL
CREAT SPEC-SCNC: 208 MG/DL
EGFRCR SERPLBLD CKD-EPI 2021: 86 ML/MIN/1.73M2
EOSINOPHIL # BLD AUTO: 0.01 K/UL
EOSINOPHIL NFR BLD AUTO: 0.2 %
ESTIMATED AVERAGE GLUCOSE: 148 MG/DL
GLUCOSE SERPL-MCNC: 117 MG/DL
HBA1C MFR BLD HPLC: 6.8 %
HCT VFR BLD CALC: 39 %
HDLC SERPL-MCNC: 85 MG/DL
HGB BLD-MCNC: 13.5 G/DL
IMM GRANULOCYTES NFR BLD AUTO: 0.2 %
LDLC SERPL CALC-MCNC: 80 MG/DL
LYMPHOCYTES # BLD AUTO: 2.09 K/UL
LYMPHOCYTES NFR BLD AUTO: 31.7 %
MAN DIFF?: NORMAL
MCHC RBC-ENTMCNC: 32.8 PG
MCHC RBC-ENTMCNC: 34.6 G/DL
MCV RBC AUTO: 94.7 FL
MICROALBUMIN 24H UR DL<=1MG/L-MCNC: 3.7 MG/DL
MICROALBUMIN/CREAT 24H UR-RTO: 18 MG/G
MONOCYTES # BLD AUTO: 0.43 K/UL
MONOCYTES NFR BLD AUTO: 6.5 %
NEUTROPHILS # BLD AUTO: 4.04 K/UL
NEUTROPHILS NFR BLD AUTO: 61.1 %
NONHDLC SERPL-MCNC: 91 MG/DL
PLATELET # BLD AUTO: 254 K/UL
POTASSIUM SERPL-SCNC: 4.4 MMOL/L
PROT SERPL-MCNC: 7 G/DL
RBC # BLD: 4.12 M/UL
RBC # FLD: 12 %
SODIUM SERPL-SCNC: 141 MMOL/L
T3 SERPL-MCNC: 85 NG/DL
T4 FREE SERPL-MCNC: 1.3 NG/DL
TRIGL SERPL-MCNC: 56 MG/DL
TSH SERPL-ACNC: 0.97 UIU/ML
WBC # FLD AUTO: 6.6 K/UL

## 2025-03-28 ENCOUNTER — TRANSCRIPTION ENCOUNTER (OUTPATIENT)
Age: 45
End: 2025-03-28

## 2025-06-04 ENCOUNTER — APPOINTMENT (OUTPATIENT)
Dept: FAMILY MEDICINE | Facility: CLINIC | Age: 45
End: 2025-06-04

## 2025-06-05 ENCOUNTER — APPOINTMENT (OUTPATIENT)
Dept: FAMILY MEDICINE | Facility: CLINIC | Age: 45
End: 2025-06-05
Payer: MEDICAID

## 2025-06-05 VITALS
TEMPERATURE: 98.2 F | WEIGHT: 143 LBS | HEIGHT: 68 IN | BODY MASS INDEX: 21.67 KG/M2 | OXYGEN SATURATION: 98 % | DIASTOLIC BLOOD PRESSURE: 83 MMHG | HEART RATE: 86 BPM | SYSTOLIC BLOOD PRESSURE: 126 MMHG

## 2025-06-05 DIAGNOSIS — E10.9 TYPE 1 DIABETES MELLITUS W/OUT COMPLICATIONS: ICD-10-CM

## 2025-06-05 DIAGNOSIS — F41.8 OTHER SPECIFIED ANXIETY DISORDERS: ICD-10-CM

## 2025-06-05 PROCEDURE — 99214 OFFICE O/P EST MOD 30 MIN: CPT | Mod: 25

## 2025-06-10 LAB
ALBUMIN SERPL ELPH-MCNC: 4.6 G/DL
ALP BLD-CCNC: 72 U/L
ALT SERPL-CCNC: 20 U/L
ANION GAP SERPL CALC-SCNC: 14 MMOL/L
AST SERPL-CCNC: 29 U/L
BILIRUB SERPL-MCNC: 0.4 MG/DL
BUN SERPL-MCNC: 8 MG/DL
CALCIUM SERPL-MCNC: 9.6 MG/DL
CHLORIDE SERPL-SCNC: 105 MMOL/L
CHOLEST SERPL-MCNC: 148 MG/DL
CO2 SERPL-SCNC: 20 MMOL/L
CREAT SERPL-MCNC: 1 MG/DL
EGFRCR SERPLBLD CKD-EPI 2021: 70 ML/MIN/1.73M2
ESTIMATED AVERAGE GLUCOSE: 146 MG/DL
GLUCOSE SERPL-MCNC: 137 MG/DL
HBA1C MFR BLD HPLC: 6.7 %
HDLC SERPL-MCNC: 80 MG/DL
LDLC SERPL-MCNC: 59 MG/DL
NONHDLC SERPL-MCNC: 68 MG/DL
POTASSIUM SERPL-SCNC: 5.1 MMOL/L
PROT SERPL-MCNC: 7.4 G/DL
SODIUM SERPL-SCNC: 139 MMOL/L
TRIGL SERPL-MCNC: 40 MG/DL

## 2025-06-25 ENCOUNTER — APPOINTMENT (OUTPATIENT)
Dept: DERMATOLOGY | Facility: CLINIC | Age: 45
End: 2025-06-25
Payer: MEDICAID

## 2025-06-25 PROBLEM — L81.4 LENTIGO: Status: ACTIVE | Noted: 2020-07-15

## 2025-06-25 PROBLEM — D22.9 MULTIPLE NEVI: Status: ACTIVE | Noted: 2025-06-25

## 2025-06-25 PROBLEM — D18.01 CHERRY ANGIOMA: Status: ACTIVE | Noted: 2025-06-25

## 2025-06-25 PROCEDURE — 99214 OFFICE O/P EST MOD 30 MIN: CPT

## 2025-06-26 ENCOUNTER — APPOINTMENT (OUTPATIENT)
Dept: ULTRASOUND IMAGING | Facility: CLINIC | Age: 45
End: 2025-06-26
Payer: MEDICAID

## 2025-06-26 ENCOUNTER — OUTPATIENT (OUTPATIENT)
Dept: OUTPATIENT SERVICES | Facility: HOSPITAL | Age: 45
LOS: 1 days | End: 2025-06-26

## 2025-06-26 PROCEDURE — 76536 US EXAM OF HEAD AND NECK: CPT | Mod: 26

## 2025-06-27 ENCOUNTER — TRANSCRIPTION ENCOUNTER (OUTPATIENT)
Age: 45
End: 2025-06-27

## 2025-07-02 ENCOUNTER — NON-APPOINTMENT (OUTPATIENT)
Age: 45
End: 2025-07-02

## 2025-07-08 ENCOUNTER — RX RENEWAL (OUTPATIENT)
Age: 45
End: 2025-07-08

## 2025-07-08 ENCOUNTER — TRANSCRIPTION ENCOUNTER (OUTPATIENT)
Age: 45
End: 2025-07-08

## 2025-09-10 ENCOUNTER — TRANSCRIPTION ENCOUNTER (OUTPATIENT)
Age: 45
End: 2025-09-10

## 2025-09-11 ENCOUNTER — APPOINTMENT (OUTPATIENT)
Dept: ENDOCRINOLOGY | Facility: CLINIC | Age: 45
End: 2025-09-11
Payer: MEDICAID

## 2025-09-11 VITALS
HEART RATE: 79 BPM | BODY MASS INDEX: 22.35 KG/M2 | WEIGHT: 147 LBS | SYSTOLIC BLOOD PRESSURE: 133 MMHG | DIASTOLIC BLOOD PRESSURE: 85 MMHG

## 2025-09-11 DIAGNOSIS — Z96.41 PRESENCE OF INSULIN PUMP (EXTERNAL) (INTERNAL): ICD-10-CM

## 2025-09-11 DIAGNOSIS — R68.89 OTHER GENERAL SYMPTOMS AND SIGNS: ICD-10-CM

## 2025-09-11 DIAGNOSIS — E10.9 TYPE 1 DIABETES MELLITUS W/OUT COMPLICATIONS: ICD-10-CM

## 2025-09-11 DIAGNOSIS — E04.2 NONTOXIC MULTINODULAR GOITER: ICD-10-CM

## 2025-09-11 LAB
GLUCOSE BLDC GLUCOMTR-MCNC: 198
HBA1C MFR BLD HPLC: 6.6

## 2025-09-11 PROCEDURE — 99214 OFFICE O/P EST MOD 30 MIN: CPT | Mod: 25

## 2025-09-11 PROCEDURE — 82962 GLUCOSE BLOOD TEST: CPT

## 2025-09-11 PROCEDURE — 83036 HEMOGLOBIN GLYCOSYLATED A1C: CPT | Mod: QW

## 2025-09-12 ENCOUNTER — TRANSCRIPTION ENCOUNTER (OUTPATIENT)
Age: 45
End: 2025-09-12

## 2025-09-12 LAB
ALBUMIN SERPL ELPH-MCNC: 4.5 G/DL
ALBUMIN, RANDOM URINE: 5.2 MG/DL
ALP BLD-CCNC: 73 U/L
ALT SERPL-CCNC: 24 U/L
ANION GAP SERPL CALC-SCNC: 14 MMOL/L
AST SERPL-CCNC: 31 U/L
BASOPHILS # BLD AUTO: 0.01 K/UL
BASOPHILS NFR BLD AUTO: 0.2 %
BILIRUB SERPL-MCNC: 0.5 MG/DL
BUN SERPL-MCNC: 7 MG/DL
CALCIUM SERPL-MCNC: 9.6 MG/DL
CHLORIDE SERPL-SCNC: 106 MMOL/L
CHOLEST SERPL-MCNC: 152 MG/DL
CO2 SERPL-SCNC: 22 MMOL/L
CREAT SERPL-MCNC: 0.83 MG/DL
CREAT SPEC-SCNC: 267 MG/DL
EGFRCR SERPLBLD CKD-EPI 2021: 87 ML/MIN/1.73M2
EOSINOPHIL # BLD AUTO: 0.01 K/UL
EOSINOPHIL NFR BLD AUTO: 0.2 %
ESTIMATED AVERAGE GLUCOSE: 140 MG/DL
GLUCOSE SERPL-MCNC: 172 MG/DL
HBA1C MFR BLD HPLC: 6.5 %
HCT VFR BLD CALC: 38.5 %
HDLC SERPL-MCNC: 65 MG/DL
HGB BLD-MCNC: 13.1 G/DL
IMM GRANULOCYTES NFR BLD AUTO: 0.2 %
LDLC SERPL-MCNC: 70 MG/DL
LYMPHOCYTES # BLD AUTO: 1.33 K/UL
LYMPHOCYTES NFR BLD AUTO: 26.3 %
MAN DIFF?: NORMAL
MCHC RBC-ENTMCNC: 33.6 PG
MCHC RBC-ENTMCNC: 34 G/DL
MCV RBC AUTO: 98.7 FL
MICROALBUMIN/CREAT 24H UR-RTO: 19 MG/G
MONOCYTES # BLD AUTO: 0.39 K/UL
MONOCYTES NFR BLD AUTO: 7.7 %
NEUTROPHILS # BLD AUTO: 3.3 K/UL
NEUTROPHILS NFR BLD AUTO: 65.4 %
NONHDLC SERPL-MCNC: 87 MG/DL
PLATELET # BLD AUTO: 231 K/UL
POTASSIUM SERPL-SCNC: 4.9 MMOL/L
PROT SERPL-MCNC: 7 G/DL
RBC # BLD: 3.9 M/UL
RBC # FLD: 11.8 %
SODIUM SERPL-SCNC: 142 MMOL/L
T3 SERPL-MCNC: 73 NG/DL
T4 FREE SERPL-MCNC: 1.2 NG/DL
TRIGL SERPL-MCNC: 86 MG/DL
TSH SERPL-ACNC: 0.65 UIU/ML
WBC # FLD AUTO: 5.05 K/UL

## 2025-09-15 ENCOUNTER — TRANSCRIPTION ENCOUNTER (OUTPATIENT)
Age: 45
End: 2025-09-15

## 2025-09-16 ENCOUNTER — TRANSCRIPTION ENCOUNTER (OUTPATIENT)
Age: 45
End: 2025-09-16